# Patient Record
Sex: MALE | Race: ASIAN | NOT HISPANIC OR LATINO | Employment: UNEMPLOYED | URBAN - METROPOLITAN AREA
[De-identification: names, ages, dates, MRNs, and addresses within clinical notes are randomized per-mention and may not be internally consistent; named-entity substitution may affect disease eponyms.]

---

## 2023-10-29 ENCOUNTER — APPOINTMENT (OUTPATIENT)
Dept: CT IMAGING | Facility: HOSPITAL | Age: 33
End: 2023-10-29
Attending: EMERGENCY MEDICINE
Payer: COMMERCIAL

## 2023-10-29 ENCOUNTER — HOSPITAL ENCOUNTER (INPATIENT)
Facility: HOSPITAL | Age: 33
LOS: 2 days | Discharge: HOME | End: 2023-11-01
Attending: EMERGENCY MEDICINE | Admitting: FAMILY MEDICINE
Payer: COMMERCIAL

## 2023-10-29 DIAGNOSIS — B96.81 DUODENAL ULCER DUE TO HELICOBACTER PYLORI: Primary | ICD-10-CM

## 2023-10-29 DIAGNOSIS — K26.9 DUODENAL ULCER DUE TO HELICOBACTER PYLORI: Primary | ICD-10-CM

## 2023-10-29 DIAGNOSIS — K92.1 GASTROINTESTINAL HEMORRHAGE WITH MELENA: ICD-10-CM

## 2023-10-29 LAB
ABO/RH(D): NORMAL
ALBUMIN SERPL BCG-MCNC: 3.9 G/DL (ref 3.5–5.2)
ALP SERPL-CCNC: 53 U/L (ref 40–129)
ALT SERPL W P-5'-P-CCNC: 25 U/L (ref 0–70)
ANION GAP SERPL CALCULATED.3IONS-SCNC: 13 MMOL/L (ref 7–15)
ANTIBODY SCREEN: NEGATIVE
AST SERPL W P-5'-P-CCNC: 16 U/L (ref 0–45)
BASOPHILS # BLD AUTO: 0 10E3/UL (ref 0–0.2)
BASOPHILS NFR BLD AUTO: 0 %
BILIRUB DIRECT SERPL-MCNC: <0.2 MG/DL (ref 0–0.3)
BILIRUB SERPL-MCNC: 0.3 MG/DL
BUN SERPL-MCNC: 37.3 MG/DL (ref 6–20)
CALCIUM SERPL-MCNC: 8.7 MG/DL (ref 8.6–10)
CHLORIDE SERPL-SCNC: 105 MMOL/L (ref 98–107)
CREAT SERPL-MCNC: 0.97 MG/DL (ref 0.67–1.17)
DEPRECATED HCO3 PLAS-SCNC: 21 MMOL/L (ref 22–29)
EGFRCR SERPLBLD CKD-EPI 2021: >90 ML/MIN/1.73M2
EOSINOPHIL # BLD AUTO: 0 10E3/UL (ref 0–0.7)
EOSINOPHIL NFR BLD AUTO: 0 %
ERYTHROCYTE [DISTWIDTH] IN BLOOD BY AUTOMATED COUNT: 13.1 % (ref 10–15)
GLUCOSE SERPL-MCNC: 128 MG/DL (ref 70–99)
HCT VFR BLD AUTO: 33.1 % (ref 40–53)
HGB BLD-MCNC: 10.8 G/DL (ref 13.3–17.7)
HGB BLD-MCNC: 9.9 G/DL (ref 13.3–17.7)
IMM GRANULOCYTES # BLD: 0.1 10E3/UL
IMM GRANULOCYTES NFR BLD: 0 %
LYMPHOCYTES # BLD AUTO: 3 10E3/UL (ref 0.8–5.3)
LYMPHOCYTES NFR BLD AUTO: 26 %
MCH RBC QN AUTO: 28.7 PG (ref 26.5–33)
MCHC RBC AUTO-ENTMCNC: 32.6 G/DL (ref 31.5–36.5)
MCV RBC AUTO: 88 FL (ref 78–100)
MONOCYTES # BLD AUTO: 0.7 10E3/UL (ref 0–1.3)
MONOCYTES NFR BLD AUTO: 6 %
NEUTROPHILS # BLD AUTO: 7.9 10E3/UL (ref 1.6–8.3)
NEUTROPHILS NFR BLD AUTO: 68 %
NRBC # BLD AUTO: 0 10E3/UL
NRBC BLD AUTO-RTO: 0 /100
PLATELET # BLD AUTO: 223 10E3/UL (ref 150–450)
POTASSIUM SERPL-SCNC: 4.1 MMOL/L (ref 3.4–5.3)
PROT SERPL-MCNC: 6 G/DL (ref 6.4–8.3)
RBC # BLD AUTO: 3.76 10E6/UL (ref 4.4–5.9)
SODIUM SERPL-SCNC: 139 MMOL/L (ref 135–145)
SPECIMEN EXPIRATION DATE: NORMAL
WBC # BLD AUTO: 11.6 10E3/UL (ref 4–11)

## 2023-10-29 PROCEDURE — C9113 INJ PANTOPRAZOLE SODIUM, VIA: HCPCS | Mod: JZ | Performed by: EMERGENCY MEDICINE

## 2023-10-29 PROCEDURE — 96361 HYDRATE IV INFUSION ADD-ON: CPT

## 2023-10-29 PROCEDURE — 96365 THER/PROPH/DIAG IV INF INIT: CPT | Mod: 59

## 2023-10-29 PROCEDURE — 74174 CTA ABD&PLVS W/CONTRAST: CPT

## 2023-10-29 PROCEDURE — 250N000011 HC RX IP 250 OP 636: Mod: JZ | Performed by: EMERGENCY MEDICINE

## 2023-10-29 PROCEDURE — 86901 BLOOD TYPING SEROLOGIC RH(D): CPT | Performed by: EMERGENCY MEDICINE

## 2023-10-29 PROCEDURE — 36415 COLL VENOUS BLD VENIPUNCTURE: CPT | Performed by: EMERGENCY MEDICINE

## 2023-10-29 PROCEDURE — 85025 COMPLETE CBC W/AUTO DIFF WBC: CPT | Performed by: EMERGENCY MEDICINE

## 2023-10-29 PROCEDURE — 93005 ELECTROCARDIOGRAM TRACING: CPT | Performed by: EMERGENCY MEDICINE

## 2023-10-29 PROCEDURE — 85018 HEMOGLOBIN: CPT | Performed by: EMERGENCY MEDICINE

## 2023-10-29 PROCEDURE — 99222 1ST HOSP IP/OBS MODERATE 55: CPT | Mod: GC

## 2023-10-29 PROCEDURE — 86850 RBC ANTIBODY SCREEN: CPT | Performed by: EMERGENCY MEDICINE

## 2023-10-29 PROCEDURE — 86923 COMPATIBILITY TEST ELECTRIC: CPT

## 2023-10-29 PROCEDURE — 82248 BILIRUBIN DIRECT: CPT | Performed by: EMERGENCY MEDICINE

## 2023-10-29 PROCEDURE — 99285 EMERGENCY DEPT VISIT HI MDM: CPT | Mod: 25

## 2023-10-29 PROCEDURE — 258N000003 HC RX IP 258 OP 636: Performed by: EMERGENCY MEDICINE

## 2023-10-29 PROCEDURE — 80053 COMPREHEN METABOLIC PANEL: CPT | Performed by: EMERGENCY MEDICINE

## 2023-10-29 PROCEDURE — 83036 HEMOGLOBIN GLYCOSYLATED A1C: CPT

## 2023-10-29 PROCEDURE — 96376 TX/PRO/DX INJ SAME DRUG ADON: CPT

## 2023-10-29 RX ORDER — SODIUM CHLORIDE, SODIUM LACTATE, POTASSIUM CHLORIDE, CALCIUM CHLORIDE 600; 310; 30; 20 MG/100ML; MG/100ML; MG/100ML; MG/100ML
INJECTION, SOLUTION INTRAVENOUS CONTINUOUS
Status: DISCONTINUED | OUTPATIENT
Start: 2023-10-29 | End: 2023-10-30

## 2023-10-29 RX ORDER — IBUPROFEN 200 MG
400 TABLET ORAL EVERY 4 HOURS PRN
Status: ON HOLD | COMMUNITY
End: 2023-11-01

## 2023-10-29 RX ORDER — IOPAMIDOL 755 MG/ML
100 INJECTION, SOLUTION INTRAVASCULAR ONCE
Status: COMPLETED | OUTPATIENT
Start: 2023-10-29 | End: 2023-10-29

## 2023-10-29 RX ADMIN — IOPAMIDOL 90 ML: 755 INJECTION, SOLUTION INTRAVENOUS at 21:34

## 2023-10-29 RX ADMIN — SODIUM CHLORIDE 8 MG/HR: 9 INJECTION, SOLUTION INTRAVENOUS at 23:29

## 2023-10-29 RX ADMIN — SODIUM CHLORIDE, POTASSIUM CHLORIDE, SODIUM LACTATE AND CALCIUM CHLORIDE: 600; 310; 30; 20 INJECTION, SOLUTION INTRAVENOUS at 23:31

## 2023-10-29 RX ADMIN — SODIUM CHLORIDE 1000 ML: 9 INJECTION, SOLUTION INTRAVENOUS at 20:53

## 2023-10-29 RX ADMIN — PANTOPRAZOLE SODIUM 40 MG: 40 INJECTION, POWDER, FOR SOLUTION INTRAVENOUS at 22:01

## 2023-10-29 ASSESSMENT — ACTIVITIES OF DAILY LIVING (ADL)
ADLS_ACUITY_SCORE: 35
ADLS_ACUITY_SCORE: 35

## 2023-10-29 NOTE — LETTER
[address] [address]  Phone: [phone]  Fax: [phone]    November 1, 2023        Gretel Gross  [address] [place] [address]          To whom it may concern:    RE: Gretel Gross was admitted at [facility] from 10/29/23-11/1/23. He should be medically cleared to resume work on 11/10/23.     Please contact me for questions or concerns.      Sincerely,        Alexandre Meek MD  [facility] Residency, PGY-1

## 2023-10-30 PROBLEM — K92.1 GASTROINTESTINAL HEMORRHAGE WITH MELENA: Status: ACTIVE | Noted: 2023-10-30

## 2023-10-30 LAB
ANION GAP SERPL CALCULATED.3IONS-SCNC: 11 MMOL/L (ref 7–15)
BUN SERPL-MCNC: 23.6 MG/DL (ref 6–20)
CALCIUM SERPL-MCNC: 7.8 MG/DL (ref 8.6–10)
CHLORIDE SERPL-SCNC: 106 MMOL/L (ref 98–107)
CREAT SERPL-MCNC: 1.09 MG/DL (ref 0.67–1.17)
DEPRECATED HCO3 PLAS-SCNC: 22 MMOL/L (ref 22–29)
EGFRCR SERPLBLD CKD-EPI 2021: >90 ML/MIN/1.73M2
ERYTHROCYTE [DISTWIDTH] IN BLOOD BY AUTOMATED COUNT: 13.2 % (ref 10–15)
ERYTHROCYTE [DISTWIDTH] IN BLOOD BY AUTOMATED COUNT: 13.2 % (ref 10–15)
GLUCOSE SERPL-MCNC: 112 MG/DL (ref 70–99)
HBA1C MFR BLD: 5.2 %
HCT VFR BLD AUTO: 25.5 % (ref 40–53)
HCT VFR BLD AUTO: 27 % (ref 40–53)
HGB BLD-MCNC: 7.9 G/DL (ref 13.3–17.7)
HGB BLD-MCNC: 8.5 G/DL (ref 13.3–17.7)
HGB BLD-MCNC: 9 G/DL (ref 13.3–17.7)
HOLD SPECIMEN: NORMAL
MCH RBC QN AUTO: 29.1 PG (ref 26.5–33)
MCH RBC QN AUTO: 29.3 PG (ref 26.5–33)
MCHC RBC AUTO-ENTMCNC: 33.3 G/DL (ref 31.5–36.5)
MCHC RBC AUTO-ENTMCNC: 33.3 G/DL (ref 31.5–36.5)
MCV RBC AUTO: 87 FL (ref 78–100)
MCV RBC AUTO: 88 FL (ref 78–100)
PLATELET # BLD AUTO: 166 10E3/UL (ref 150–450)
PLATELET # BLD AUTO: 177 10E3/UL (ref 150–450)
POTASSIUM SERPL-SCNC: 3.8 MMOL/L (ref 3.4–5.3)
RBC # BLD AUTO: 2.9 10E6/UL (ref 4.4–5.9)
RBC # BLD AUTO: 3.09 10E6/UL (ref 4.4–5.9)
SODIUM SERPL-SCNC: 139 MMOL/L (ref 135–145)
UPPER GI ENDOSCOPY: NORMAL
WBC # BLD AUTO: 7.9 10E3/UL (ref 4–11)
WBC # BLD AUTO: 9.5 10E3/UL (ref 4–11)

## 2023-10-30 PROCEDURE — 43235 EGD DIAGNOSTIC BRUSH WASH: CPT | Performed by: INTERNAL MEDICINE

## 2023-10-30 PROCEDURE — 36415 COLL VENOUS BLD VENIPUNCTURE: CPT | Performed by: FAMILY MEDICINE

## 2023-10-30 PROCEDURE — 80048 BASIC METABOLIC PNL TOTAL CA: CPT

## 2023-10-30 PROCEDURE — 250N000011 HC RX IP 250 OP 636: Mod: JZ | Performed by: INTERNAL MEDICINE

## 2023-10-30 PROCEDURE — 88305 TISSUE EXAM BY PATHOLOGIST: CPT | Mod: 26 | Performed by: PATHOLOGY

## 2023-10-30 PROCEDURE — 120N000001 HC R&B MED SURG/OB

## 2023-10-30 PROCEDURE — 85027 COMPLETE CBC AUTOMATED: CPT

## 2023-10-30 PROCEDURE — 88342 IMHCHEM/IMCYTCHM 1ST ANTB: CPT | Mod: TC | Performed by: INTERNAL MEDICINE

## 2023-10-30 PROCEDURE — 36415 COLL VENOUS BLD VENIPUNCTURE: CPT

## 2023-10-30 PROCEDURE — 0DB68ZX EXCISION OF STOMACH, VIA NATURAL OR ARTIFICIAL OPENING ENDOSCOPIC, DIAGNOSTIC: ICD-10-PCS | Performed by: INTERNAL MEDICINE

## 2023-10-30 PROCEDURE — 88342 IMHCHEM/IMCYTCHM 1ST ANTB: CPT | Mod: 26 | Performed by: PATHOLOGY

## 2023-10-30 PROCEDURE — 250N000009 HC RX 250: Performed by: INTERNAL MEDICINE

## 2023-10-30 PROCEDURE — 85018 HEMOGLOBIN: CPT | Performed by: FAMILY MEDICINE

## 2023-10-30 PROCEDURE — C9113 INJ PANTOPRAZOLE SODIUM, VIA: HCPCS | Mod: JZ | Performed by: INTERNAL MEDICINE

## 2023-10-30 PROCEDURE — 258N000003 HC RX IP 258 OP 636

## 2023-10-30 PROCEDURE — 99232 SBSQ HOSP IP/OBS MODERATE 35: CPT | Mod: GC

## 2023-10-30 PROCEDURE — 258N000003 HC RX IP 258 OP 636: Performed by: EMERGENCY MEDICINE

## 2023-10-30 PROCEDURE — 999N000099 HC STATISTIC MODERATE SEDATION < 10 MIN: Performed by: INTERNAL MEDICINE

## 2023-10-30 RX ORDER — AMOXICILLIN 250 MG
2 CAPSULE ORAL 2 TIMES DAILY PRN
Status: DISCONTINUED | OUTPATIENT
Start: 2023-10-30 | End: 2023-11-01 | Stop reason: HOSPADM

## 2023-10-30 RX ORDER — FENTANYL CITRATE 50 UG/ML
INJECTION, SOLUTION INTRAMUSCULAR; INTRAVENOUS PRN
Status: DISCONTINUED | OUTPATIENT
Start: 2023-10-30 | End: 2023-10-30 | Stop reason: HOSPADM

## 2023-10-30 RX ORDER — ONDANSETRON 2 MG/ML
4 INJECTION INTRAMUSCULAR; INTRAVENOUS EVERY 6 HOURS PRN
Status: DISCONTINUED | OUTPATIENT
Start: 2023-10-30 | End: 2023-11-01 | Stop reason: HOSPADM

## 2023-10-30 RX ORDER — FLUMAZENIL 0.1 MG/ML
0.2 INJECTION, SOLUTION INTRAVENOUS
Status: DISCONTINUED | OUTPATIENT
Start: 2023-10-30 | End: 2023-10-30 | Stop reason: HOSPADM

## 2023-10-30 RX ORDER — SIMETHICONE 40MG/0.6ML
133 SUSPENSION, DROPS(FINAL DOSAGE FORM)(ML) ORAL
Status: DISCONTINUED | OUTPATIENT
Start: 2023-10-30 | End: 2023-10-30 | Stop reason: HOSPADM

## 2023-10-30 RX ORDER — EPINEPHRINE 1 MG/ML
0.1 INJECTION, SOLUTION INTRAMUSCULAR; SUBCUTANEOUS
Status: DISCONTINUED | OUTPATIENT
Start: 2023-10-30 | End: 2023-10-30 | Stop reason: HOSPADM

## 2023-10-30 RX ORDER — POLYETHYLENE GLYCOL 3350 17 G/17G
17 POWDER, FOR SOLUTION ORAL DAILY PRN
Status: DISCONTINUED | OUTPATIENT
Start: 2023-10-30 | End: 2023-11-01 | Stop reason: HOSPADM

## 2023-10-30 RX ORDER — NALOXONE HYDROCHLORIDE 0.4 MG/ML
0.4 INJECTION, SOLUTION INTRAMUSCULAR; INTRAVENOUS; SUBCUTANEOUS
Status: DISCONTINUED | OUTPATIENT
Start: 2023-10-30 | End: 2023-10-30 | Stop reason: HOSPADM

## 2023-10-30 RX ORDER — DIPHENHYDRAMINE HYDROCHLORIDE 50 MG/ML
25-50 INJECTION INTRAMUSCULAR; INTRAVENOUS
Status: DISCONTINUED | OUTPATIENT
Start: 2023-10-30 | End: 2023-10-30 | Stop reason: HOSPADM

## 2023-10-30 RX ORDER — PROCHLORPERAZINE 25 MG
25 SUPPOSITORY, RECTAL RECTAL EVERY 12 HOURS PRN
Status: DISCONTINUED | OUTPATIENT
Start: 2023-10-30 | End: 2023-11-01 | Stop reason: HOSPADM

## 2023-10-30 RX ORDER — PROCHLORPERAZINE MALEATE 10 MG
10 TABLET ORAL EVERY 6 HOURS PRN
Status: DISCONTINUED | OUTPATIENT
Start: 2023-10-30 | End: 2023-11-01 | Stop reason: HOSPADM

## 2023-10-30 RX ORDER — ATROPINE SULFATE 0.1 MG/ML
1 INJECTION INTRAVENOUS
Status: DISCONTINUED | OUTPATIENT
Start: 2023-10-30 | End: 2023-10-30 | Stop reason: HOSPADM

## 2023-10-30 RX ORDER — ONDANSETRON 4 MG/1
4 TABLET, ORALLY DISINTEGRATING ORAL EVERY 6 HOURS PRN
Status: DISCONTINUED | OUTPATIENT
Start: 2023-10-30 | End: 2023-11-01 | Stop reason: HOSPADM

## 2023-10-30 RX ORDER — AMOXICILLIN 250 MG
1 CAPSULE ORAL 2 TIMES DAILY PRN
Status: DISCONTINUED | OUTPATIENT
Start: 2023-10-30 | End: 2023-11-01 | Stop reason: HOSPADM

## 2023-10-30 RX ORDER — ACETAMINOPHEN 650 MG/1
650 SUPPOSITORY RECTAL EVERY 6 HOURS PRN
Status: DISCONTINUED | OUTPATIENT
Start: 2023-10-30 | End: 2023-11-01 | Stop reason: HOSPADM

## 2023-10-30 RX ORDER — ACETAMINOPHEN 325 MG/1
650 TABLET ORAL EVERY 6 HOURS PRN
Status: DISCONTINUED | OUTPATIENT
Start: 2023-10-30 | End: 2023-11-01 | Stop reason: HOSPADM

## 2023-10-30 RX ORDER — FENTANYL CITRATE 50 UG/ML
50-100 INJECTION, SOLUTION INTRAMUSCULAR; INTRAVENOUS EVERY 5 MIN PRN
Status: DISCONTINUED | OUTPATIENT
Start: 2023-10-30 | End: 2023-10-30 | Stop reason: HOSPADM

## 2023-10-30 RX ORDER — NALOXONE HYDROCHLORIDE 0.4 MG/ML
0.2 INJECTION, SOLUTION INTRAMUSCULAR; INTRAVENOUS; SUBCUTANEOUS
Status: DISCONTINUED | OUTPATIENT
Start: 2023-10-30 | End: 2023-10-30 | Stop reason: HOSPADM

## 2023-10-30 RX ADMIN — PANTOPRAZOLE SODIUM 40 MG: 40 INJECTION, POWDER, FOR SOLUTION INTRAVENOUS at 11:05

## 2023-10-30 RX ADMIN — SODIUM CHLORIDE, POTASSIUM CHLORIDE, SODIUM LACTATE AND CALCIUM CHLORIDE: 600; 310; 30; 20 INJECTION, SOLUTION INTRAVENOUS at 08:21

## 2023-10-30 RX ADMIN — SODIUM CHLORIDE, POTASSIUM CHLORIDE, SODIUM LACTATE AND CALCIUM CHLORIDE 1000 ML: 600; 310; 30; 20 INJECTION, SOLUTION INTRAVENOUS at 01:43

## 2023-10-30 ASSESSMENT — ACTIVITIES OF DAILY LIVING (ADL)
ADLS_ACUITY_SCORE: 37
ADLS_ACUITY_SCORE: 35
ADLS_ACUITY_SCORE: 37

## 2023-10-30 NOTE — PLAN OF CARE
"  Problem: Adult Inpatient Plan of Care  Goal: Plan of Care Review  Description: The Plan of Care Review/Shift note should be completed every shift.  The Outcome Evaluation is a brief statement about your assessment that the patient is improving, declining, or no change.  This information will be displayed automatically on your shift  note.  Outcome: Progressing     Problem: Adult Inpatient Plan of Care  Goal: Optimal Comfort and Wellbeing  Outcome: Progressing     Problem: Adult Inpatient Plan of Care  Goal: Patient-Specific Goal (Individualized)  Description: You can add care plan individualizations to a care plan. Examples of Individualization might be:  \"Parent requests to be called daily at 9am for status\", \"I have a hard time hearing out of my right ear\", or \"Do not touch me to wake me up as it startles  me\".  Outcome: Progressing   Goal Outcome Evaluation:       10/30 0400 Resident paged and notified about patient drop in hgb to a 9 and patient being tachycardic at 105, MD stated \"thank you for the update and continue to monitor\".       Patient stable, vitals stable through the night, no complaints of pain, Hgb down from a 9.9 to a 9.0,  no active signs of bleeding, pending 6am labs to be drawn, report given to Veronique GARCÍA all questions answered.           "

## 2023-10-30 NOTE — PRE-PROCEDURE
GENERAL PRE-PROCEDURE:   Procedure:  Esophagogastroduodenoscopy  Date/Time:  10/30/2023 9:23 AM    Verbal consent obtained?: Yes    Written consent obtained?: Yes    Risks and benefits: Risks, benefits and alternatives were discussed    Consent given by:  Patient  Patient states understanding of procedure being performed: Yes    Patient's understanding of procedure matches consent: Yes    Procedure consent matches procedure scheduled: Yes    Expected level of sedation:  Moderate  Appropriately NPO:  Yes  ASA Class:  2  Mallampati  :  Grade 2- soft palate, base of uvula, tonsillar pillars, and portion of posterior pharyngeal wall visible  Lungs:  Lungs clear with good breath sounds bilaterally  Heart:  Normal heart sounds and rate  History & Physical reviewed:  History and physical reviewed and no updates needed  Statement of review:  I have reviewed the lab findings, diagnostic data, medications, and the plan for sedation

## 2023-10-30 NOTE — PROGRESS NOTES
Resident/Fellow Attestation   I, DERRICK ARIAS MD, was present with the medical student who participated in the service and in the documentation of the note.  I have verified the history and personally performed the physical exam and medical decision making.  I agree with the assessment and plan of care as documented in the note.      Repeat hemoglobin of 7.9; still feeling tired. Will re-check hemoglobin in the AM.       DERRICK ARIAS MD  PGY1  Date of Service (when I saw the patient): 10/30/23    [facility]    Progress Note - Hospitalist Service       Date of Admission:  10/29/2023    Assessment & Plan   Gretel Gross is a 32 year old male admitted on 10/29/2023. He has no significant past medical history and is admitted for melena with suspected GI bleed.     Melena  Concern for upper GI bleed  Anemia: 10.8 -> 8.5   Sinus Tachycardia  Patient with 1 day history of melena, shortness of breath, fatigue, palpitations. Found to be tachycardic to 115 and anemic with a Hgb of 10.8 and elevated BUN on arrival. Tachycardia improved slightly overnight after fluid boluses but remained tachycardic at 119 in AM. Has been otherwise stable. Unsure of etiology of upper GI bleed; patient notes no history of bleeding, no family history of GI bleeds or malignancies. Did note increased alcohol use in the past 1-2 months due to life stressors and family deaths; reports 3-4 drinks daily. No caffeine or other substances. EGD biopsy pending.   - Protonix 8mg/hr infusion  - GI consulted; EGD 10/30 AM   - Normal esophagus, gastritis with biopsy, non-bleeding duodenal ulcer with no stigmata of bleeding.    - Recommendations:     - Protonix 40mg IV daily until discharge, omeprazole 20mg daily for 60 days. Okay to discharge once Hgb stable.   -Recheck hemoglobin in AM; if stable, can likely discharge  -Advance diet as tolerated  - Follow up alcohol use; patient appears motivated to quit drinking    Leukocytosis,  improved  WBC elevated to 11.6 on arrival, improved to 7.9.     Obesity  Hyperglycemia  Glucose of 128 on admission but with A1C of 5.2.     Healthcare Maintenance  No PCP, will discuss follow up closer to discharge.         Diet: Regular Diet Adult    DVT Prophylaxis: Pneumatic Compression Devices  South Catheter: Not present  Fluids: LR @ 150 ml/hr.   Lines: None     Cardiac Monitoring: ACTIVE order. Indication: Acute GIB - anemia - sinus tachycardia  Code Status: Full Code        Disposition Plan      Expected Discharge Date: 10/31/23               The patient's care was discussed with MD OPAL Abreu, MS4  Medical Student  Hospitalist Service  [facility]  Securely message with [employer] (more info)  Text page via [facility] Paging/Directory   ______________________________________________________________________    Interval History   Patient stable overnight, remained tachycardic and Hgb continued to fall after fluid boluses, but otherwise stable. Stable after EGD, feeling well overall. Noted increased life stress at work and with several family deaths. Has had increased alcohol use with 3-4 drinks daily but states he is not going to continue this on discharge and is aware that this may have contributed to his GI bleed.     Physical Exam   Vital Signs: Temp: 98.8  F (37.1  C) Temp src: Temporal BP: 110/57 Pulse: 109   Resp: 16 SpO2: 100 % O2 Device: None (Room air) Oxygen Delivery: 2 LPM  Weight: 220 lbs 0 oz    Constitutional: awake, alert, cooperative, no apparent distress, and appears stated age  Respiratory: No increased work of breathing, good air exchange, clear to auscultation bilaterally, no crackles or wheezing  Cardiovascular: Normal apical impulse, regular rate and rhythm, normal S1 and S2, no S3 or S4, and no murmur noted  GI: No scars, normal bowel sounds, soft, non-distended, non-tender, no masses palpated, no hepatosplenomegally    Data     I have personally  reviewed the following data over the past 24 hrs:    7.9  \   8.5 (L)   / 166     139 106 23.6 (H) /  112 (H)   3.8 22 1.09 \     ALT: 25 AST: 16 AP: 53 TBILI: 0.3   ALB: 3.9 TOT PROTEIN: 6.0 (L) LIPASE: N/A     TSH: N/A T4: N/A A1C: 5.2       Imaging results reviewed over the past 24 hrs:   Recent Results (from the past 24 hour(s))   CTA Abdomen Pelvis with Contrast    Narrative    EXAM: CTA ABDOMEN PELVIS WITH CONTRAST  LOCATION: [facility]  DATE: 10/29/2023    INDICATION: gi bleeding  COMPARISON: None.  TECHNIQUE: CT angiogram abdomen pelvis during arterial phase of injection of IV contrast. 2D and 3D MIP reconstructions were performed by the CT technologist. Dose reduction techniques were used.  CONTRAST: 90 ml iso 370    FINDINGS:  ANGIOGRAM ABDOMEN/PELVIS: No evidence for abdominal aortic aneurysm. No significant atherosclerotic disease involving the abdominal aorta or the branch vessels. No hemodynamically significant stenosis involving the branch arteries of the abdominal aorta   and the iliac arteries in the pelvis. No CTA evidence for active GI extravasation.    LOWER CHEST: Dependent atelectasis in the posterior lung bases.    HEPATOBILIARY: No significant mass or bile duct dilatation. No calcified gallstones.     PANCREAS: No significant mass, duct dilatation, or inflammatory change.    SPLEEN: Normal.    ADRENAL GLANDS: Normal.    KIDNEYS/BLADDER: No significant mass, stone, or hydronephrosis.    BOWEL: Nonspecific nonobstructive bowel gas pattern. Appendix visualized and appears within normal limits. No evidence for colitis or diverticulitis.    LYMPH NODES: No lymphadenopathy.    PELVIC ORGANS: No pelvic masses.    MUSCULOSKELETAL: No concerning osseous abnormalities.      Impression    IMPRESSION:  1.  No evidence for abdominal aortic aneurysm. No significant atherosclerotic disease involving the abdominal aorta or the branch vessels. No hemodynamically significant stenosis  involving the branch arteries of the abdominal aorta and the iliac arteries   in the pelvis. No CTA evidence for active GI extravasation.    2.  Nonspecific nonobstructive bowel gas pattern. Appendix visualized and appears within normal limits. No evidence for colitis or diverticulitis.      186.9

## 2023-10-30 NOTE — CONSULTS
"                                     CONSULTING PHYSICIAN   Rangel Garcia MD     REASON FOR CONSULTATION   Melena     CHIEF COMPLAINT   Melena     HISTORY OF PRESENT ILLNESS   Gretel Gross is a 32 year old came to the hospital for evaluation of melena.    Patient notes 6 episodes of black stool yesterday. No previous hx of GI bleeding. No abd pain.   Not on NSAIDs, no hx of H Pylori  [place] No international travel. Parents live him and were born internationally.        PAST HISTORY   History reviewed. No pertinent past medical history.   History reviewed. No pertinent surgical history.     Family History Social History   History reviewed. No pertinent family history. Social History     Tobacco Use    Smoking status: Every Day          MEDICATIONS & ALLERGIES   Medications Prior to Admission   Medication Sig Dispense Refill Last Dose    ibuprofen (ADVIL/MOTRIN) 200 MG tablet Take 400 mg by mouth every 4 hours as needed for pain   Unknown at prn        ALLERGIES   No Known Allergies      REVIEW OF SYSTEMS   A comprehensive review of systems was performed and was otherwise noncontributory.     OBJECTIVE   Vitals Blood pressure 105/69, pulse 100, temperature 98.8  F (37.1  C), temperature source Temporal, resp. rate 16, height 1.702 m (5' 7\"), weight 99.8 kg (220 lb), SpO2 98%.           Physical  Exam  GENERAL: alert and oriented, well nourished in no apparent distress   SKIN: warm and dry, no rashes   HEENT: atraumatic, anicteric, moist mucous membranes, neck soft/supple    PULMONARY: normal resp effort, breath sounds clear to auscultation bilateral   CARDIOVASCULAR: normal rate and rhythm, no murmurs, no edema   ABDOMEN: no tenderness, no distention, bowel sounds normal   MUSCULOSKELETAL: joints and gait normal   NEUROLOGICAL: appropriate mental status, grossly intact  DERM: no rash, no jaundice   PSYCHIATRIC: normal mood, affect and insight        LABORATORY    ELECTROLYTE PANEL   Recent Labs   Lab " 10/30/23  0703 10/29/23  2053    139   POTASSIUM 3.8 4.1   CHLORIDE 106 105   CO2 22 21*   * 128*   CR 1.09 0.97   BUN 23.6* 37.3*      HEMATOLOGY PANEL   Recent Labs   Lab 10/30/23  0703 10/30/23  0331 10/29/23  2250 10/29/23  2053   HGB 8.5* 9.0* 9.9* 10.8*   MCV 88 87  --  88   WBC 7.9 9.5  --  11.6*    177  --  223      LIVER AND PANCREAS PANEL   Recent Labs   Lab 10/29/23  2053   AST 16   ALT 25   ALKPHOS 53   BILITOTAL 0.3     IMAGING STUDIES        I have reviewed the current diagnostic and laboratory tests.           IMPRESSION   Gretel Gross is a 32 year old male with melena    Melena- hgb 8.5. Suspect upper GI bleed, likely PUD.   Started PPI   Plan EGD  Keep npo               Arnie Ha MD  Thank you for the opportunity to participate in the care of this patient.   Please feel free to call me with any questions or concerns.  Phone number [phone].

## 2023-10-30 NOTE — H&P
[facility]    History and Physical - Hospitalist Service       Date of Admission:  10/29/2023    Assessment & Plan      Gretel Gross is a 32 year old male admitted on 10/29/2023. He has an uncomplicated past medical history and is admitted for melana and likely upper GIB.     Melana  C/f upper GIB  Anemia: 10.8 -> 9.9 (after 1 L NS bolus)  Sinus Tachycardia   Patient is hemodynamically stable - tachycardic to 115 but normotensive. 1 day of multiple melanotic stools associated with worsening symptoms of anemia (SOB, dizziness, profound fatigue, heart palpitations). Feeling well in weeks prior other than increased stress (work and family). Hgb 10.8 on admission and 9.9 on recheck after 1 L bolus - unknown baseline. CTA Abd/pelv without signs of active GI extravasation. Suspect patient is experiencing and upper GIB based on melanotic stooling, anemia, and elevated BUN. The etiology of his upper GIB could be many things including PUD (H pylori, stress induced or idiopathic), angiodysplasia or other vascular lesion, upper GI tumor. Most likely to represent PUD from H pylori. There may be a component of stress/smoking contributing as well. Less likely to be secondary to portal hypertension with no significant ascites, history of significant alcohol abuse and normal LFTs. No reason to suspect a traumatic bleed from vomiting as patient has not had emesis.   - Protonix 8 mg/hr infusion continuously  - S/p 1 L NS bolus and will give another 1 L LR bolus - will continue with  ml/hr infusion after bolus  - NPO in prep for likely upper endoscopy tomorrow  - GI consult: Appreciate their assistance   - Likely to due upper endoscopy tomorrow   - Will call GI earlier if patient's tachycardia does not improve with second bolus and/or tachycardia worsens   - Cardiac Telemetry overnight  - Recheck Hgb at 0300  - AM labs: BMP and CBC     Leukocytosis  Very mild elevation to 11.6. Suspect that this is due  "to his likely upper GIB. There could be an element of stress reaction and/or hyperproliferation 2/2 increased bone marrow stimulation in the setting of anemia.  - Repeat CBC in AM    Obesity  Elevated blood sugar   Glucose of 128 on admission. Could be a stress reaction. Does not follow with regular healthcare provider.  - A1c of 5.2 on admission    Tobacco abuse  1/2 PPD smoker    Health maintenance  Does not follow with a regular healthcare provider. Appears to be experiencing significant stressors in the recent weeks.  - Establish care with PCP         Diet: NPO per Anesthesia Guidelines for Procedure/Surgery Except for: Meds  DVT Prophylaxis: Pneumatic Compression Devices  South Catheter: Not present  Fluids: LR @ 150 ml/hr.  Lines: None     Cardiac Monitoring: ACTIVE order. Indication: Acute GIB - anemia - sinus tachycardia  Code Status: Full Code    Clinically Significant Risk Factors Present on Admission                       # Obesity: Estimated body mass index is 34.46 kg/m  as calculated from the following:    Height as of this encounter: 1.702 m (5' 7\").    Weight as of this encounter: 99.8 kg (220 lb).              Disposition Plan      Expected Discharge Date: 11/01/2023                The patient's care was discussed with the Attending Physician, Dr. Garcia and morning report .      Sheldon Mauro MD  Hospitalist Service  [facility]  Securely message with "[employer] (more info)  Text page via [employer] Paging/Directory   ______________________________________________________________________    Chief Complaint   Dark stool and fatigue    History is obtained from the patient    History of Present Illness   Gretel Gross is a 32 year old male with an uncomplicated past medical history and is admitted for melana and possible GIB.     Patient has had 5-6 dark/black loose stools today. Initially patient wondered if it was related to his change in diet, however, throughout the day he began to " develop fatigue, shortness of breath, light headedness with standing and in general, and heart palpitations as well. This prompted him to come in for evaluation. He has never had black stools like this previously but has had rare, intermittent bright red blood during bowel movements and with wiping. Patient has been more stressed with work and family life in the past few weeks but has otherwise felt well.     No recent illness, cough, runny nose, abdominal pain, change in urination or change in bowel movements (before today), headache, chest pain.    No family history of GIB, colon/stomach/GI cancers.     Lives in a house in [place] with parents, wife, 15 month daughter. Works at the [employer]. Very stressed recently (stressful work season and deaths in the family). 1/2 PPD. Alcohol 2-3 times weekly with about 4 beers. No other recreational drug use.     Past Medical History    History reviewed. No pertinent past medical history.    Past Surgical History   History reviewed. No pertinent surgical history.    Prior to Admission Medications   Prior to Admission Medications   Prescriptions Last Dose Informant Patient Reported? Taking?   ibuprofen (ADVIL/MOTRIN) 200 MG tablet Unknown at prn Self Yes Yes   Sig: Take 400 mg by mouth every 4 hours as needed for pain      Facility-Administered Medications: None           Physical Exam   Vital Signs: Temp: 97.7  F (36.5  C) Temp src: Oral BP: 124/71 Pulse: 110   Resp: 21 SpO2: 100 % O2 Device: None (Room air)    Weight: 220 lbs 0 oz    Constitutional: awake, alert, cooperative, no apparent distress, and appears stated age  Eyes: Lids and lashes normal, pupils equal, round and reactive to light, extra ocular muscles intact, sclera clear, conjunctiva normal  ENT: Normocephalic, without obvious abnormality, atraumatic, external ears without lesions, oral pharynx with moist mucous membranes, tonsils without erythema or exudates, gums normal and good dentition.  Hematologic /  Lymphatic: no cervical lymphadenopathy and no supraclavicular lymphadenopathy  Respiratory: No increased work of breathing, good air exchange, clear to auscultation bilaterally, no crackles or wheezing  Cardiovascular: Tachycardic rate and normal sinus rhythm, normal S1 and S2, and no murmur noted  GI: No scars, hypoactive bowel sounds, soft, mildly distended, non-tender, no masses palpated, no hepatosplenomegaly  Skin: normal skin color, texture, turgor  Musculoskeletal: There is no redness, warmth, or swelling of the joints.  Full range of motion noted.  Motor strength is 5 out of 5 all extremities bilaterally.  Tone is normal.  Neurologic: Awake, alert, oriented to name, place and time.  Cranial nerves II-XII are grossly intact.  Motor is 5 out of 5 bilaterally.   Neuropsychiatric: General: normal, mildly anxious regarding procedure, and normal eye contact  Level of consciousness: alert / normal  Affect: normal  Orientation: oriented to self, place, time and situation    Medical Decision Making       Please see A&P for additional details of medical decision making.      Data     I have personally reviewed the following data over the past 24 hrs:    11.6 (H)  \   9.9 (L)   / 223     139 105 37.3 (H) /  128 (H)   4.1 21 (L) 0.97 \     ALT: 25 AST: 16 AP: 53 TBILI: 0.3   ALB: 3.9 TOT PROTEIN: 6.0 (L) LIPASE: N/A       Imaging results reviewed over the past 24 hrs:   Recent Results (from the past 24 hour(s))   CTA Abdomen Pelvis with Contrast    Narrative    EXAM: CTA ABDOMEN PELVIS WITH CONTRAST  LOCATION: [facility]  DATE: 10/29/2023    INDICATION: gi bleeding  COMPARISON: None.  TECHNIQUE: CT angiogram abdomen pelvis during arterial phase of injection of IV contrast. 2D and 3D MIP reconstructions were performed by the CT technologist. Dose reduction techniques were used.  CONTRAST: 90 ml iso 370    FINDINGS:  ANGIOGRAM ABDOMEN/PELVIS: No evidence for abdominal aortic aneurysm. No  significant atherosclerotic disease involving the abdominal aorta or the branch vessels. No hemodynamically significant stenosis involving the branch arteries of the abdominal aorta   and the iliac arteries in the pelvis. No CTA evidence for active GI extravasation.    LOWER CHEST: Dependent atelectasis in the posterior lung bases.    HEPATOBILIARY: No significant mass or bile duct dilatation. No calcified gallstones.     PANCREAS: No significant mass, duct dilatation, or inflammatory change.    SPLEEN: Normal.    ADRENAL GLANDS: Normal.    KIDNEYS/BLADDER: No significant mass, stone, or hydronephrosis.    BOWEL: Nonspecific nonobstructive bowel gas pattern. Appendix visualized and appears within normal limits. No evidence for colitis or diverticulitis.    LYMPH NODES: No lymphadenopathy.    PELVIC ORGANS: No pelvic masses.    MUSCULOSKELETAL: No concerning osseous abnormalities.      Impression    IMPRESSION:  1.  No evidence for abdominal aortic aneurysm. No significant atherosclerotic disease involving the abdominal aorta or the branch vessels. No hemodynamically significant stenosis involving the branch arteries of the abdominal aorta and the iliac arteries   in the pelvis. No CTA evidence for active GI extravasation.    2.  Nonspecific nonobstructive bowel gas pattern. Appendix visualized and appears within normal limits. No evidence for colitis or diverticulitis.

## 2023-10-30 NOTE — ED PROVIDER NOTES
"EMERGENCY DEPARTMENT NOTE     Name: Gretel Gross    Age/Sex: 32 year old male   MRN: [id number]   Evaluation Date & Time:  10/29/2023  8:07 PM    PCP:    No primary care provider on file.   ED Provider: Boris Lipscomb D.O.       CHIEF COMPLAINT    Rectal Bleeding       DIAGNOSIS & DISPOSITION/MEDICAL DECISION MAKING     1. Gastrointestinal hemorrhage with melena        Gretel Gross is a 32 year old male with no recorded relevant past history who presents to the emergency department for evaluation of five episodes of dark stool today with associated dizziness.    Differential  diagnosis considered included but not limited to Upper or lower GI bleeding    Medical Decision Making  Patient initially tachycardic.  Abdomen nontender.  Rectal exam showed maroon stool/blood.  CTA of the abdomen pelvis without active bleeding noted.  Initial hemoglobin 10.8.  Patient received IV fluids and was also started on Protonix and Protonix drip.  Patient had 1 melanotic stool approximately 100 cc while in the emergency department.  Serial abdominal exams remain nontender.  Heart rate improved, remained normotensive, serial hemoglobin 9.9.  Patient will be admitted, serial hemoglobins, monitor vital signs, GI consultation in the a.m.  Case was discussed with the [place] resident Dr. So and [facility] Dr. Wasserman  Current findings and plan for admission discussed with the patient and his wife and they are in agreement.  Interventions: IV fluids, Protonix  Discharge Vital Signs:/75   Pulse 103   Temp 97.7  F (36.5  C) (Oral)   Resp 16   Ht 1.702 m (5' 7\")   Wt 99.8 kg (220 lb)   SpO2 97%   BMI 34.46 kg/m       DISPOSITION: Admit To [facility] telemetry/PVS    Diagnostic studies:  Imaging:  CTA Abdomen Pelvis with Contrast   Final Result   IMPRESSION:   1.  No evidence for abdominal aortic aneurysm. No significant atherosclerotic disease involving the abdominal aorta or the branch vessels. No hemodynamically significant " stenosis involving the branch arteries of the abdominal aorta and the iliac arteries    in the pelvis. No CTA evidence for active GI extravasation.      2.  Nonspecific nonobstructive bowel gas pattern. Appendix visualized and appears within normal limits. No evidence for colitis or diverticulitis.         Lab:  Labs Ordered and Resulted from Time of ED Arrival to Time of ED Departure   BASIC METABOLIC PANEL - Abnormal       Result Value    Sodium 139      Potassium 4.1      Chloride 105      Carbon Dioxide (CO2) 21 (*)     Anion Gap 13      Urea Nitrogen 37.3 (*)     Creatinine 0.97      GFR Estimate >90      Calcium 8.7      Glucose 128 (*)    CBC WITH PLATELETS AND DIFFERENTIAL - Abnormal    WBC Count 11.6 (*)     RBC Count 3.76 (*)     Hemoglobin 10.8 (*)     Hematocrit 33.1 (*)     MCV 88      MCH 28.7      MCHC 32.6      RDW 13.1      Platelet Count 223      % Neutrophils 68      % Lymphocytes 26      % Monocytes 6      % Eosinophils 0      % Basophils 0      % Immature Granulocytes 0      NRBCs per 100 WBC 0      Absolute Neutrophils 7.9      Absolute Lymphocytes 3.0      Absolute Monocytes 0.7      Absolute Eosinophils 0.0      Absolute Basophils 0.0      Absolute Immature Granulocytes 0.1      Absolute NRBCs 0.0     HEPATIC FUNCTION PANEL - Abnormal    Protein Total 6.0 (*)     Albumin 3.9      Bilirubin Total 0.3      Alkaline Phosphatase 53      AST 16      ALT 25      Bilirubin Direct <0.20     HEMOGLOBIN - Abnormal    Hemoglobin 9.9 (*)    HEMOGLOBIN A1C - Normal    Hemoglobin A1C 5.2     BASIC METABOLIC PANEL   CBC WITH PLATELETS   CBC WITH PLATELETS   TYPE AND SCREEN, ADULT    ABO/RH(D) B POS      Antibody Screen Negative      SPECIMEN EXPIRATION DATE [id number]     ABO/RH TYPE AND SCREEN        EKG: Sinus tachycardia with nonspecific ST and T wave changes.          Triage note reviewed:Pt here with black stools that started this afternoon. He is feeling fatigued, pale and clammy. No blood  thinners, no NSAIDS, no iron supplements.Denies abdominal pain. This has not happened before.     Triage Assessment (Adult)       Row Name 10/29/23 2004          Triage Assessment    Airway WDL WDL        Respiratory WDL    Respiratory WDL WDL        Skin Circulation/Temperature WDL    Skin Circulation/Temperature WDL X;temperature     Skin Temperature cool  clammy        Cardiac WDL    Cardiac WDL X;rhythm     Pulse Rate & Regularity tachycardic                         History:  Supplemental history from: Documented in chart, if applicable and Family Member/Significant Other patient's wife  External Record(s) reviewed: Documented in chart, if applicable.    Work Up:  Chart documentation includes differential considered and any EKGs or imaging independently interpreted by provider, where specified.  In additional to work up documented, I considered the following work up: N/A    External consultation:  Discussion of management with another provider: Documented in chart, if applicable and Gastroenterology and Hospitalist    Complicating factors:  Care impacted by chronic illness: N/A  Care affected by social determinants of health: N/A    Disposition considerations: Admit.    At the conclusion of the encounter I discussed the results of all of the tests and the disposition. The questions were answered. The patient or family acknowledged understanding and was agreeable with the care plan.    TOTAL CRITICAL CARE TIME (EXCLUDING PROCEDURES): Not applicable    PROCEDURES:   None    EMERGENCY DEPARTMENT COURSE   8:28 PM I met with the patient to gather history and to perform my initial exam.  We discussed treatment options and the plan for care while in the Emergency Department.  10:41 PM I discussed the case with Resident hospitalist, Dr Mauro, who accepts the patient    10:48 PM Spoke with [facility].     ED INTERVENTIONS     Medications   lactated ringers infusion ( Intravenous Rate/Dose Verify 10/30/23 0251)    pantoprazole (PROTONIX) 80 mg in sodium chloride 0.9 % 100 mL infusion (8 mg/hr Intravenous Rate/Dose Verify 10/30/23 0250)   melatonin tablet 1 mg (has no administration in time range)   acetaminophen (TYLENOL) tablet 650 mg (has no administration in time range)     Or   acetaminophen (TYLENOL) Suppository 650 mg (has no administration in time range)   senna-docusate (SENOKOT-S/PERICOLACE) 8.6-50 MG per tablet 1 tablet (has no administration in time range)     Or   senna-docusate (SENOKOT-S/PERICOLACE) 8.6-50 MG per tablet 2 tablet (has no administration in time range)   polyethylene glycol (MIRALAX) Packet 17 g (has no administration in time range)   ondansetron (ZOFRAN ODT) ODT tab 4 mg (has no administration in time range)     Or   ondansetron (ZOFRAN) injection 4 mg (has no administration in time range)   prochlorperazine (COMPAZINE) injection 10 mg (has no administration in time range)     Or   prochlorperazine (COMPAZINE) tablet 10 mg (has no administration in time range)     Or   prochlorperazine (COMPAZINE) suppository 25 mg (has no administration in time range)   sodium chloride 0.9% BOLUS 1,000 mL (0 mLs Intravenous Stopped 10/29/23 2201)   iopamidol (ISOVUE-370) solution 100 mL (90 mLs Intravenous $Given 10/29/23 2134)   lactated ringers BOLUS 1,000 mL (0 mLs Intravenous Stopped 10/30/23 0243)       DISCHARGE MEDICATIONS        Review of your medicines        UNREVIEWED medicines. Ask your doctor about these medicines        Dose / Directions   ibuprofen 200 MG tablet  Commonly known as: ADVIL/MOTRIN      Dose: 400 mg  Take 400 mg by mouth every 4 hours as needed for pain  Refills: 0                INFORMATION SOURCE AND LIMITATIONS    History/Exam limitations: none   Patient information was obtained from: the patient   Use of : N/A    HISTORY OF PRESENT ILLNESS   Gretel Gross is a 32 year old year old male with no recorded relevant past history, who presents to this ED via walk in for  evaluation of rectal bleeding.    The patient reports the onset of black stool this morning. He has had four episodes of black stool since then, and this evening saw the onset of dizziness, prompting presentation to the ED. He notes that he drinks a few beers a couple of times a week. He denies using ibuprofen. The patient denies abdominal pain, lightheadedness, and any other symptoms or complaints at this time.     REVIEW OF SYSTEMS:   All other systems reviewed and are negative except as noted above in HPI.    PATIENT HISTORY   History reviewed. No pertinent past medical history.  Patient Active Problem List   Diagnosis    Gastrointestinal hemorrhage with melena     History reviewed. No pertinent surgical history.    No Known Allergies    OUTPATIENT MEDICATIONS     New Prescriptions    No medications on file      Vitals:    10/30/23 0030 10/30/23 0100 10/30/23 0200 10/30/23 0243   BP: 124/71 109/71  115/75   Pulse: 110 108 102 103   Resp: 21 17 19 16   Temp:       TempSrc:       SpO2: 100% 100% 96% 97%   Weight:       Height:           Physical Exam   Constitutional: Oriented to person, place, and time. Appears well-developed and well-nourished.   Cardiovascular: Tachycardic, regular rhythm and normal heart sounds.    Pulmonary/Chest: Normal effort  and breath sounds normal.   Abdominal: Soft. Bowel sounds are normal. Non tender.   : Rectal exam showed maroon colored blood.     Skin: Skin is warm and dry.   Psychiatric: Normal mood and affect. Behavior is normal. Thought content normal.     DIAGNOSTICS    LABORATORY FINDINGS (REVIEWED AND INTERPRETED):  Labs Ordered and Resulted from Time of ED Arrival to Time of ED Departure   BASIC METABOLIC PANEL - Abnormal       Result Value    Sodium 139      Potassium 4.1      Chloride 105      Carbon Dioxide (CO2) 21 (*)     Anion Gap 13      Urea Nitrogen 37.3 (*)     Creatinine 0.97      GFR Estimate >90      Calcium 8.7      Glucose 128 (*)    CBC WITH PLATELETS AND  DIFFERENTIAL - Abnormal    WBC Count 11.6 (*)     RBC Count 3.76 (*)     Hemoglobin 10.8 (*)     Hematocrit 33.1 (*)     MCV 88      MCH 28.7      MCHC 32.6      RDW 13.1      Platelet Count 223      % Neutrophils 68      % Lymphocytes 26      % Monocytes 6      % Eosinophils 0      % Basophils 0      % Immature Granulocytes 0      NRBCs per 100 WBC 0      Absolute Neutrophils 7.9      Absolute Lymphocytes 3.0      Absolute Monocytes 0.7      Absolute Eosinophils 0.0      Absolute Basophils 0.0      Absolute Immature Granulocytes 0.1      Absolute NRBCs 0.0     HEPATIC FUNCTION PANEL - Abnormal    Protein Total 6.0 (*)     Albumin 3.9      Bilirubin Total 0.3      Alkaline Phosphatase 53      AST 16      ALT 25      Bilirubin Direct <0.20     HEMOGLOBIN - Abnormal    Hemoglobin 9.9 (*)    HEMOGLOBIN A1C - Normal    Hemoglobin A1C 5.2     BASIC METABOLIC PANEL   CBC WITH PLATELETS   CBC WITH PLATELETS   TYPE AND SCREEN, ADULT    ABO/RH(D) B POS      Antibody Screen Negative      SPECIMEN EXPIRATION DATE [id number]     ABO/RH TYPE AND SCREEN         IMAGING (REVIEWED AND INTERPRETED):  CTA Abdomen Pelvis with Contrast   Final Result   IMPRESSION:   1.  No evidence for abdominal aortic aneurysm. No significant atherosclerotic disease involving the abdominal aorta or the branch vessels. No hemodynamically significant stenosis involving the branch arteries of the abdominal aorta and the iliac arteries    in the pelvis. No CTA evidence for active GI extravasation.      2.  Nonspecific nonobstructive bowel gas pattern. Appendix visualized and appears within normal limits. No evidence for colitis or diverticulitis.            ECG (REVIEWED AND INTERPRETED):   ECG:   Performed at: 20:50  HR:  131 bpm  Rhythm: Sinus  Axis: 114  QRS duration: 70  QTC: 404  ST changes: No ST segment elevation or depression, no T wave inversion,No Q wave  Interpretation: Sinus tachycardia with nonspecific ST and T wave changes  No prior for  comparison    I have reviewed the patient's ECG, with comments made as listed above. Please see scanned image for full interpretation.         I, Laurie Toma, am serving as a scribe to document services personally performed by Boris Lipscomb D.O., based on my observation and the provider s statements to me.    I, Boris Lipscomb D.O., attest that Laurie Chua is acting in a scribe capacity, has observed my performance of the services and has documented them in accordance with my direction.    Boris Lipscomb D.O.  EMERGENCY MEDICINE   10/29/23  [facility] EMERGENCY DEPARTMENT  [address] [place] [id number]  [phone]  Dept: [phone]      Boris Lipscomb,   10/30/23 0306

## 2023-10-30 NOTE — ED NOTES
Bed: [place] [address]  Expected date:   Expected time:   Means of arrival:   Comments:  Hold for room 20

## 2023-10-30 NOTE — PLAN OF CARE
"  Problem: Adult Inpatient Plan of Care  Goal: Patient-Specific Goal (Individualized)  Description: You can add care plan individualizations to a care plan. Examples of Individualization might be:  \"Parent requests to be called daily at 9am for status\", \"I have a hard time hearing out of my right ear\", or \"Do not touch me to wake me up as it startles  me\".  Outcome: Progressing     Problem: Anemia  Goal: Anemia Symptom Improvement  Outcome: Not Progressing  Intervention: Monitor and Manage Anemia  Recent Flowsheet Documentation  Taken 10/30/2023 0815 by Veronique Foley, RN  Safety Promotion/Fall Prevention:   activity supervised   lighting adjusted   nonskid shoes/slippers when out of bed   room near nurse's station   safety round/check completed   supervised activity   clutter free environment maintained   Goal Outcome Evaluation:      Plan of Care Reviewed With: patient      Patient heart rate high 90's to tachy in the low 100's. Hemoglobin slowly dropping, last hemoglobin was 8.5 this morning. Recent hemoglobin around 1400, expect results soon.           "

## 2023-10-30 NOTE — MEDICATION SCRIBE - ADMISSION MEDICATION HISTORY
Medication Scribe Admission Medication History    Admission medication history is complete. The information provided in this note is only as accurate as the sources available at the time of the update.    Information Source(s): Patient via in-person    Pertinent Information: Patient states that currently he is not taking any Rx's except otc Ibuprofen prn    Changes made to PTA medication list:  Added: Ibuprofen 200 mg tablet(per patient)  Deleted: None  Changed: None    Medication Affordability:  Not including over the counter (OTC) medications, was there a time in the past 3 months when you did not take your medications as prescribed because of cost?: No    Allergies reviewed with patient and updates made in EHR: yes    Medication History Completed By: Cleopatra Cerda 10/29/2023 10:54 PM    PTA Med List   Medication Sig Last Dose    ibuprofen (ADVIL/MOTRIN) 200 MG tablet Take 400 mg by mouth every 4 hours as needed for pain Unknown at prn

## 2023-10-30 NOTE — ED TRIAGE NOTES
Pt here with black stools that started this afternoon. He is feeling fatigued, pale and clammy. No blood thinners, no NSAIDS, no iron supplements.Denies abdominal pain. This has not happened before.     Triage Assessment (Adult)       Row Name 10/29/23 2004          Triage Assessment    Airway WDL WDL        Respiratory WDL    Respiratory WDL WDL        Skin Circulation/Temperature WDL    Skin Circulation/Temperature WDL X;temperature     Skin Temperature cool  clammy        Cardiac WDL    Cardiac WDL X;rhythm     Pulse Rate & Regularity tachycardic

## 2023-10-31 LAB
BLD PROD TYP BPU: NORMAL
BLOOD COMPONENT TYPE: NORMAL
CODING SYSTEM: NORMAL
CROSSMATCH: NORMAL
HGB BLD-MCNC: 6.6 G/DL (ref 13.3–17.7)
HGB BLD-MCNC: 8 G/DL (ref 13.3–17.7)
ISSUE DATE AND TIME: NORMAL
PATH REPORT.COMMENTS IMP SPEC: NORMAL
PATH REPORT.COMMENTS IMP SPEC: NORMAL
PATH REPORT.FINAL DX SPEC: NORMAL
PATH REPORT.GROSS SPEC: NORMAL
PATH REPORT.MICROSCOPIC SPEC OTHER STN: NORMAL
PATH REPORT.RELEVANT HX SPEC: NORMAL
PHOTO IMAGE: NORMAL
UNIT ABO/RH: NORMAL
UNIT NUMBER: NORMAL
UNIT STATUS: NORMAL
UNIT TYPE ISBT: 7300

## 2023-10-31 PROCEDURE — 250N000011 HC RX IP 250 OP 636: Mod: JZ | Performed by: INTERNAL MEDICINE

## 2023-10-31 PROCEDURE — 36415 COLL VENOUS BLD VENIPUNCTURE: CPT

## 2023-10-31 PROCEDURE — C9113 INJ PANTOPRAZOLE SODIUM, VIA: HCPCS | Mod: JZ | Performed by: INTERNAL MEDICINE

## 2023-10-31 PROCEDURE — 99233 SBSQ HOSP IP/OBS HIGH 50: CPT | Mod: GC

## 2023-10-31 PROCEDURE — 85018 HEMOGLOBIN: CPT

## 2023-10-31 PROCEDURE — 120N000001 HC R&B MED SURG/OB

## 2023-10-31 PROCEDURE — P9016 RBC LEUKOCYTES REDUCED: HCPCS

## 2023-10-31 PROCEDURE — 250N000013 HC RX MED GY IP 250 OP 250 PS 637: Performed by: INTERNAL MEDICINE

## 2023-10-31 RX ORDER — METRONIDAZOLE 250 MG/1
250 TABLET ORAL 4 TIMES DAILY
Status: DISCONTINUED | OUTPATIENT
Start: 2023-10-31 | End: 2023-11-01 | Stop reason: HOSPADM

## 2023-10-31 RX ORDER — TETRACYCLINE HYDROCHLORIDE 250 MG/1
500 CAPSULE ORAL 4 TIMES DAILY
Status: DISCONTINUED | OUTPATIENT
Start: 2023-10-31 | End: 2023-11-01 | Stop reason: HOSPADM

## 2023-10-31 RX ORDER — BISMUTH SUBSALICYLATE 262 MG/1
524 TABLET, CHEWABLE ORAL
Status: DISCONTINUED | OUTPATIENT
Start: 2023-10-31 | End: 2023-11-01 | Stop reason: HOSPADM

## 2023-10-31 RX ADMIN — METRONIDAZOLE 250 MG: 250 TABLET ORAL at 16:31

## 2023-10-31 RX ADMIN — METRONIDAZOLE 250 MG: 250 TABLET ORAL at 21:03

## 2023-10-31 RX ADMIN — PANTOPRAZOLE SODIUM 40 MG: 40 INJECTION, POWDER, FOR SOLUTION INTRAVENOUS at 09:16

## 2023-10-31 RX ADMIN — TETRACYCLINE HYDROCHLORIDE 500 MG: 250 CAPSULE ORAL at 16:31

## 2023-10-31 RX ADMIN — BISMUTH SUBSALICYLATE 524 MG: 262 TABLET, CHEWABLE ORAL at 16:29

## 2023-10-31 RX ADMIN — PANTOPRAZOLE SODIUM 40 MG: 40 INJECTION, POWDER, FOR SOLUTION INTRAVENOUS at 21:03

## 2023-10-31 RX ADMIN — TETRACYCLINE HYDROCHLORIDE 500 MG: 250 CAPSULE ORAL at 21:03

## 2023-10-31 RX ADMIN — BISMUTH SUBSALICYLATE 524 MG: 262 TABLET, CHEWABLE ORAL at 21:57

## 2023-10-31 ASSESSMENT — ACTIVITIES OF DAILY LIVING (ADL)
ADLS_ACUITY_SCORE: 37

## 2023-10-31 NOTE — PLAN OF CARE
Problem: Gastrointestinal Bleeding  Goal: Hemostasis  Intervention: Manage Gastrointestinal Bleeding  Recent Flowsheet Documentation  Taken 10/31/2023 1300 by Inés Salas, RN  Bleeding Management: blood products administered  Stabilization Measures: blood products administered  Environmental Support:   calm environment promoted   caregiver consistency promoted   distractions minimized   rest periods encouraged  Taken 10/31/2023 0900 by Inés Salas, RN  Bleeding Management: blood products administered  Stabilization Measures: blood products administered  Environmental Support:   calm environment promoted   caregiver consistency promoted   distractions minimized   rest periods encouraged       Goal Outcome Evaluation: Patient's hemoglobin low this morning, order received to transfuse one unit PRBCS. Recheck hemoglobin at 1500.

## 2023-10-31 NOTE — PROGRESS NOTES
Pt alert and oriented. Got up once and felt like he may have a BM. Only had black tarry smear. BP running soft and HR slightly tachycardic. Hgb currently 7.9, plan for monitoring overnight. Tolerating regular diet. Wife at bedside, supportive.

## 2023-10-31 NOTE — PLAN OF CARE
Problem: Adult Inpatient Plan of Care  Goal: Optimal Comfort and Wellbeing  Outcome: Progressing     Problem: Anemia  Goal: Anemia Symptom Improvement  Outcome: Progressing  Intervention: Monitor and Manage Anemia  Recent Flowsheet Documentation  Taken 10/31/2023 0146 by Ann Pink RN  Safety Promotion/Fall Prevention: activity supervised  Taken 10/30/2023 2056 by Ann Pink RN  Safety Promotion/Fall Prevention: activity supervised     Problem: Fall Injury Risk  Goal: Absence of Fall and Fall-Related Injury  Outcome: Progressing  Intervention: Identify and Manage Contributors  Recent Flowsheet Documentation  Taken 10/31/2023 0146 by Ann Pink RN  Medication Review/Management:   medications reviewed   high-risk medications identified  Taken 10/30/2023 2056 by Ann Pink RN  Medication Review/Management:   medications reviewed   high-risk medications identified  Intervention: Promote Injury-Free Environment  Recent Flowsheet Documentation  Taken 10/31/2023 0146 by Ann Pink RN  Safety Promotion/Fall Prevention: activity supervised  Taken 10/30/2023 2056 by Ann Pink RN  Safety Promotion/Fall Prevention: activity supervised   Goal Outcome Evaluation:       Pt A&Ox4, denies pain throughout shift. Pt remains on remote tele, NSR. Tachycardic 100s. Soft Bps. No signs of bleeding noted. Per pt, only had 1 smear BM at 2000, black in color. BS active, denies tenderness. Pt up w/ SBA to BA for safety d/t fatigue. Pt has R PIV, saline locked. Hgb: 7.9, recheck in AM.

## 2023-10-31 NOTE — PROGRESS NOTES
"[facility] Progress Note       SUBJECTIVE:  Patient is eating fine, no abdominal pain. He noted black stool with wiping this morning but no actual BM yet today. He is still fatigued.        OBJECTIVE:  BP 90/53   Pulse 109   Temp 98  F (36.7  C) (Oral)   Resp 20   Ht 1.702 m (5' 7\")   Wt 99.8 kg (220 lb)   SpO2 99%   BMI 34.46 kg/m    Temp (24hrs), Av.4  F (36.9  C), Min:98  F (36.7  C), Max:98.8  F (37.1  C)    Patient Vitals for the past 72 hrs:   Weight   10/29/23 2003 99.8 kg (220 lb)       Intake/Output Summary (Last 24 hours) at 10/31/2023 0840  Last data filed at 10/30/2023 1400  Gross per 24 hour   Intake 220 ml   Output --   Net 220 ml        PHYSICAL EXAM  GEN: NAD, male appears stated age lying in bed  HRT: no LE edema  RESP: unlabored  ABD: soft, nontender  SKIN: No rash or jaundice      Additional Data:  I have reviewed the patient's new clinical lab results:     Recent Labs   Lab Test 10/31/23  0704 10/30/23  1443 10/30/23  0703 10/30/23  0331 10/29/23  2250 10/29/23  2053   WBC  --   --  7.9 9.5  --  11.6*   HGB 6.6* 7.9* 8.5* 9.0*   < > 10.8*   MCV  --   --  88 87  --  88   PLT  --   --  166 177  --  223    < > = values in this interval not displayed.     Recent Labs   Lab Test 10/30/23  0703 10/29/23  2053   POTASSIUM 3.8 4.1   CHLORIDE 106 105   CO2 22 21*   BUN 23.6* 37.3*   ANIONGAP 11 13     Recent Labs   Lab Test 10/29/23  2053   ALBUMIN 3.9   BILITOTAL 0.3   ALT 25   AST 16         Imaging results:  CTA abdomen/pelvis 10/29/23:  IMPRESSION:  1.  No evidence for abdominal aortic aneurysm. No significant atherosclerotic disease involving the abdominal aorta or the branch vessels. No hemodynamically significant stenosis involving the branch arteries of the abdominal aorta and the iliac arteries in the pelvis. No CTA evidence for active GI extravasation.  2.  Nonspecific nonobstructive bowel gas pattern. Appendix visualized and appears within normal limits. No evidence for " colitis or diverticulitis.    Procedure results:  EGD 10/30/23 ([facility]):  Findings:       No blood present during examination.        The examined esophagus was normal.        Scattered moderate inflammation characterized by erythema was found in        the gastric antrum. Biopsies were taken with a cold forceps for        Helicobacter pylori testing.        One non-bleeding cratered duodenal ulcer with no stigmata of bleeding        was found in the duodenal bulb. The lesion was 7 mm in largest dimension.     Impression:    - Normal esophagus.                          - Gastritis. Biopsied.                          - Non-bleeding duodenal ulcer with no stigmata of                          bleeding.   Recommendation:        - Await pathology results. (pending)                         - Use Protonix (pantoprazole) 40 mg IV daily. Upon                          discharge, will need omeprazole 20 mg daily for 2                          months                          - okay to advance diet. Low risk for rebleeding based                          upon endoscopic classification. Can discharge once                          hemoglobin stabilizes                          - regular diet      IMPRESSION:  Duodenal ulcer  Upper GI bleed  33 y/o male without significant PMH admitted 10/29 for upper GI bleed after presenting with melena and associated dizziness, fatigue and heart palpitations. CTA negative for active GI bleed. EGD 10/30 with one non-bleeding cratered duodenal ulcer and scattered moderate inflammation in the gastric antrum. Pathology pending. Hemoglobin continues to drop and is 6.6 this AM, stools continue to be black.     PLAN:  - Await EGD pathology, treat H. Pylori if positive  - Continue IV PPI through today  - Regular diet as tolerated  - Hemoglobin monitoring and transfusion per medicine      (Dr. Ha)  Simran Chappell PA-C  [facility]  10/31/2023 8:40 AM  [phone] (office)    30  minutes of total time was spent providing patient care, including patient evaluation, reviewing documentation/test results, , and documentation.  ________________________________________________________________________

## 2023-10-31 NOTE — PROGRESS NOTES
Resident/Fellow Attestation   I, DERRICK ARIAS MD, was present with the medical student who participated in the service and in the documentation of the note.  I have verified the history and personally performed the physical exam and medical decision making.  I agree with the assessment and plan of care as documented in the note.        DERRICK ARIAS MD  PGY1  Date of Service (when I saw the patient): 10/31/23    [facility]    Progress Note - Hospitalist Service       Date of Admission:  10/29/2023    Assessment & Plan   Gretel Gross is a 32 year old male admitted on 10/29/2023. He has no significant past medical history and is admitted for melena with suspected GI bleed.     Melena  Concern for upper GI bleed  Anemia: 10.8 -> 6.6   Sinus Tachycardia  Patient presented with 1 day history of melena, shortness of breath, fatigue, palpitations. Found to be tachycardic to 115 and anemic with a Hgb of 10.8 and elevated BUN on arrival. Unsure of etiology of upper GI bleed; patient notes no history of bleeding, no family history of GI bleeds or malignancies. Did note increased alcohol use in the past 1-2 months due to life stressors and family deaths; reports 3-4 drinks daily. No caffeine or other substances. EGD 10/30 without evidence of ongoing bleeding; moderate gastritis, non-bleeding duodenal ulcer with no stigmata of bleeding. Was initially cleared by GI for discharge if Hgb stabilized. Hgb continued to decrease 10/30 evening and 10/31 AM was 6.6; consented for blood and received 1 unit pRBCs.   - Recheck Hgb @1400   - Protonix 40mg IV BID  - GI recs 10/31:    - Continue IV protonix   - Regular diet   - Await EGD pathology, treat H pylori if positive    - Hgb monitoring and transfuse per medicine  - Follow up alcohol use; patient appears motivated to quit drinking    Leukocytosis, improved  WBC elevated to 11.6 on arrival, improved to 7.9.     Obesity  Hyperglycemia  Glucose of 128 on  "admission but with A1C of 5.2.     Healthcare Maintenance  No PCP, will discuss follow up closer to discharge.         Diet: Regular Diet Adult    DVT Prophylaxis: Pneumatic Compression Devices  South Catheter: Not present  Fluids: PO   Lines: None     Cardiac Monitoring: ACTIVE order. Indication: Acute GIB - anemia - sinus tachycardia  Code Status: Full Code        Disposition Plan      Expected Discharge Date: 11/1/23              The patient's care was discussed with MD OPAL Abreu, MS4  Medical Student  Hospitalist Service  [facility]  Securely message with [employer] (more info)  Text page via [facility] Paging/Directory   ______________________________________________________________________    Interval History   Patient becoming symptomatic with anemia, states that he is dizzy with mild headache and lightheadedness when standing only, otherwise stable. Remains borderline tachycardic with soft BP. One black BM last evening. No abdominal pain or nausea, appetite is good.     Physical Exam   Vital Signs: Temp: 98  F (36.7  C) Temp src: Oral BP: 90/53 Pulse: 97   Resp: 18 SpO2: 97 % O2 Device: None (Room air)    Weight: 220 lbs 0 oz    Constitutional: awake, alert, cooperative, no apparent distress, and appears stated age  Respiratory: No increased work of breathing, good air exchange, clear to auscultation bilaterally, no crackles or wheezing  Cardiovascular: Normal apical impulse, regular rate and rhythm, normal S1 and S2, no S3 or S4, and no murmur noted  GI: No scars, normal bowel sounds, soft, non-distended, non-tender, no masses palpated, no hepatosplenomegaly    Data   Lab Results   Component Value Date    WBC 7.9 10/30/2023     Lab Results   Component Value Date    RBC 2.90 10/30/2023     Lab Results   Component Value Date    HGB 6.6 10/31/2023     Lab Results   Component Value Date    HCT 25.5 10/30/2023     No components found for: \"MCT\"  Lab Results   Component " Value Date    MCV 88 10/30/2023     Lab Results   Component Value Date    MCH 29.3 10/30/2023     Lab Results   Component Value Date    MCHC 33.3 10/30/2023     Lab Results   Component Value Date    RDW 13.2 10/30/2023     Lab Results   Component Value Date     10/30/2023

## 2023-11-01 VITALS
BODY MASS INDEX: 31.11 KG/M2 | OXYGEN SATURATION: 100 % | SYSTOLIC BLOOD PRESSURE: 120 MMHG | TEMPERATURE: 99.2 F | DIASTOLIC BLOOD PRESSURE: 76 MMHG | RESPIRATION RATE: 16 BRPM | WEIGHT: 198.19 LBS | HEIGHT: 67 IN | HEART RATE: 96 BPM

## 2023-11-01 DIAGNOSIS — K92.1 GASTROINTESTINAL HEMORRHAGE WITH MELENA: Primary | ICD-10-CM

## 2023-11-01 LAB
HGB BLD-MCNC: 7.3 G/DL (ref 13.3–17.7)
HGB BLD-MCNC: 8.2 G/DL (ref 13.3–17.7)

## 2023-11-01 PROCEDURE — 85018 HEMOGLOBIN: CPT | Performed by: FAMILY MEDICINE

## 2023-11-01 PROCEDURE — 99238 HOSP IP/OBS DSCHRG MGMT 30/<: CPT | Mod: GC

## 2023-11-01 PROCEDURE — 36415 COLL VENOUS BLD VENIPUNCTURE: CPT

## 2023-11-01 PROCEDURE — 85018 HEMOGLOBIN: CPT

## 2023-11-01 PROCEDURE — 250N000011 HC RX IP 250 OP 636: Mod: JZ | Performed by: INTERNAL MEDICINE

## 2023-11-01 PROCEDURE — 36415 COLL VENOUS BLD VENIPUNCTURE: CPT | Performed by: FAMILY MEDICINE

## 2023-11-01 PROCEDURE — C9113 INJ PANTOPRAZOLE SODIUM, VIA: HCPCS | Mod: JZ | Performed by: INTERNAL MEDICINE

## 2023-11-01 PROCEDURE — 250N000013 HC RX MED GY IP 250 OP 250 PS 637: Performed by: INTERNAL MEDICINE

## 2023-11-01 RX ORDER — TETRACYCLINE HYDROCHLORIDE 500 MG/1
500 CAPSULE ORAL 4 TIMES DAILY
Qty: 52 CAPSULE | Refills: 0 | Status: SHIPPED | OUTPATIENT
Start: 2023-11-01 | End: 2023-11-14

## 2023-11-01 RX ORDER — METRONIDAZOLE 250 MG/1
250 TABLET ORAL 4 TIMES DAILY
Qty: 52 TABLET | Refills: 0 | Status: SHIPPED | OUTPATIENT
Start: 2023-11-01 | End: 2023-11-14

## 2023-11-01 RX ORDER — BISMUTH SUBSALICYLATE 262 MG/1
524 TABLET, CHEWABLE ORAL
Qty: 104 TABLET | Refills: 0 | Status: SHIPPED | OUTPATIENT
Start: 2023-11-01 | End: 2023-11-14

## 2023-11-01 RX ADMIN — TETRACYCLINE HYDROCHLORIDE 500 MG: 250 CAPSULE ORAL at 12:25

## 2023-11-01 RX ADMIN — BISMUTH SUBSALICYLATE 524 MG: 262 TABLET, CHEWABLE ORAL at 10:53

## 2023-11-01 RX ADMIN — TETRACYCLINE HYDROCHLORIDE 500 MG: 250 CAPSULE ORAL at 16:11

## 2023-11-01 RX ADMIN — PANTOPRAZOLE SODIUM 40 MG: 40 INJECTION, POWDER, FOR SOLUTION INTRAVENOUS at 08:20

## 2023-11-01 RX ADMIN — BISMUTH SUBSALICYLATE 524 MG: 262 TABLET, CHEWABLE ORAL at 16:11

## 2023-11-01 RX ADMIN — METRONIDAZOLE 250 MG: 250 TABLET ORAL at 08:20

## 2023-11-01 RX ADMIN — METRONIDAZOLE 250 MG: 250 TABLET ORAL at 16:11

## 2023-11-01 RX ADMIN — METRONIDAZOLE 250 MG: 250 TABLET ORAL at 12:25

## 2023-11-01 RX ADMIN — TETRACYCLINE HYDROCHLORIDE 500 MG: 250 CAPSULE ORAL at 08:20

## 2023-11-01 ASSESSMENT — ACTIVITIES OF DAILY LIVING (ADL)
ADLS_ACUITY_SCORE: 37
ADLS_ACUITY_SCORE: 20
ADLS_ACUITY_SCORE: 37

## 2023-11-01 NOTE — PLAN OF CARE
Goal Outcome Evaluation:      Plan of Care Reviewed With: patient    Overall Patient Progress: improving    Outcome Evaluation: Patient reports one black stool today. Hgb 7.3 this am, recheck around 1600.

## 2023-11-01 NOTE — PLAN OF CARE
Problem: Adult Inpatient Plan of Care  Goal: Absence of Hospital-Acquired Illness or Injury  Outcome: Progressing  Intervention: Identify and Manage Fall Risk  Recent Flowsheet Documentation  Taken 11/1/2023 0000 by Yoel Weller RN  Safety Promotion/Fall Prevention:   assistive device/personal items within reach   clutter free environment maintained  Taken 10/31/2023 2045 by Yoel Weller RN  Safety Promotion/Fall Prevention:   assistive device/personal items within reach   clutter free environment maintained  Intervention: Prevent Skin Injury  Recent Flowsheet Documentation  Taken 11/1/2023 0000 by Yoel Weller RN  Skin Protection: tubing/devices free from skin contact  Device Skin Pressure Protection: tubing/devices free from skin contact  Taken 10/31/2023 2045 by Yoel Weller RN  Skin Protection: tubing/devices free from skin contact  Device Skin Pressure Protection: tubing/devices free from skin contact  Intervention: Prevent Infection  Recent Flowsheet Documentation  Taken 11/1/2023 0000 by Yoel Weller RN  Infection Prevention: hand hygiene promoted  Taken 10/31/2023 2045 by Yoel Weller RN  Infection Prevention: hand hygiene promoted     Problem: Anemia  Goal: Anemia Symptom Improvement  Outcome: Progressing  Intervention: Monitor and Manage Anemia  Recent Flowsheet Documentation  Taken 11/1/2023 0000 by Yoel Weller RN  Safety Promotion/Fall Prevention:   assistive device/personal items within reach   clutter free environment maintained  Taken 10/31/2023 2045 by Yoel Weller RN  Safety Promotion/Fall Prevention:   assistive device/personal items within reach   clutter free environment maintained     Problem: Gastrointestinal Bleeding  Goal: Optimal Coping with Acute Illness  Outcome: Progressing  Intervention: Optimize Psychosocial Response  Recent Flowsheet Documentation  Taken 11/1/2023 0000 by Yoel Weller RN  Supportive Measures:   active listening utilized   decision-making  supported   self-care encouraged   self-reflection promoted   self-responsibility promoted   verbalization of feelings encouraged  Taken 10/31/2023 2045 by Yoel Weller, RN  Supportive Measures:   active listening utilized   decision-making supported   self-care encouraged   self-reflection promoted   self-responsibility promoted   verbalization of feelings encouraged  Goal: Hemostasis  Outcome: Progressing  Intervention: Manage Gastrointestinal Bleeding  Recent Flowsheet Documentation  Taken 11/1/2023 0000 by Yoel Weller, RN  Environmental Support:   calm environment promoted   caregiver consistency promoted   distractions minimized   rest periods encouraged  Taken 10/31/2023 2045 by Yoel Weller, RN  Environmental Support:   calm environment promoted   caregiver consistency promoted   distractions minimized   rest periods encouraged   Goal Outcome Evaluation:  VSS on RA, A&Ox4, denies pain. Tele NSR and at times tachy. Last Hbg 8.0 with recheck at 0600. Red diet and ind. Pt sleeping between cares.

## 2023-11-01 NOTE — PLAN OF CARE
Goal Outcome Evaluation:         Problem: Anemia  Goal: Anemia Symptom Improvement  Outcome: Progressing     Problem: Fall Injury Risk  Goal: Absence of Fall and Fall-Related Injury  Outcome: Progressing     Problem: Gastrointestinal Bleeding  Goal: Hemostasis  Outcome: Progressing     Problem: Adult Inpatient Plan of Care  Goal: Optimal Comfort and Wellbeing  Outcome: Adequate for Care Transition     Problem: Adult Inpatient Plan of Care  Goal: Absence of Hospital-Acquired Illness or Injury  Intervention: Prevent Skin Injury  Recent Flowsheet Documentation  Taken 10/31/2023 1700 by Jj Ramon RN  Body Position: position changed independently  Skin Protection: tubing/devices free from skin contact  Device Skin Pressure Protection: tubing/devices free from skin contact  Intervention: Prevent Infection  Recent Flowsheet Documentation  Taken 10/31/2023 1700 by Jj Ramon, RN  Infection Prevention: hand hygiene promoted     Patient is A/O x4. VSS. Patient denies pain. He was relieved to hear about the bump from 6.6 Hgb to 8.0 after today's transfusion. Patient up to bathroom, independently.     Appetite good.     Spouse at bedside.

## 2023-11-01 NOTE — PROGRESS NOTES
Care Management Follow Up    Length of Stay (days): 2    Expected Discharge Date: 11/01/2023    Concerns to be Addressed:   Alteration in gastrointestinal function, GI following. Monitoring labs, transfuse as ordered.   Patient plan of care discussed at interdisciplinary rounds: Yes    Anticipated Discharge Disposition:  Discharge goal is home.      Anticipated Discharge Services:  To be determined.   Anticipated Discharge DME:  None anticipated.    Patient/family educated on Medicare website which has current facility and service quality ratings:   NA  Education Provided on the Discharge Plan:   Per team  Patient/Family in Agreement with the Plan:   Yes    Referrals Placed by CM/SW:   None  Private pay costs discussed: Not applicable     Additional Information:  Per chart review, patient is independent with activities of daily living at baseline.    No care management needs identified at this time but CM will continue to monitor progression of care, review team recommendations and provide discharge planning assist as needed.      Laurie Nelson RN

## 2023-11-01 NOTE — PROGRESS NOTES
Resident/Fellow Attestation   I, ALEXANDRE ARIAS MD, was present with the medical student who participated in the service and in the documentation of the note.  I have verified the history and personally performed the physical exam and medical decision making.  I agree with the assessment and plan of care as documented in the note.      Alexandre Arias MD  [facility] [place] [facility], PGY-1     [facility]    Progress Note - Hospitalist Service       Date of Admission:  10/29/2023    Assessment & Plan   Gretel Gross is a 32 year old male admitted on 10/29/2023. He has no significant past medical history and is admitted for melena with suspected GI bleed.     Melena  Concern for upper GI bleed  Anemia: 10.8 -> 6.6   Sinus Tachycardia  Patient presented with 1 day history of melena, shortness of breath, fatigue, palpitations. Found to be tachycardic to 115 and anemic with a Hgb of 10.8 and elevated BUN on arrival. Unsure of etiology of upper GI bleed; patient notes no history of bleeding, no family history of GI bleeds or malignancies. Did note increased alcohol use in the past 1-2 months due to life stressors and family deaths; reports 3-4 drinks daily. No caffeine or other substances. EGD 10/30 without evidence of ongoing bleeding; moderate gastritis, non-bleeding duodenal ulcer with no stigmata of bleeding. Was initially cleared by GI for discharge if Hgb stabilized. Hgb continued to decrease 10/30 evening and 10/31 AM was 6.6; consented for blood and received 1 unit pRBCs. Hgb recheck after transfusion was 8.0, decreased 11/1 to 7.3.    - Recheck Hgb @1600   - GI recs 11/1; H. Pylori positive, quadruple therapy started 10/31   - Continue IV protonix 40mg BID until discharge, then omeprazole 20mg BID    - Bismuth, tetracycline, and metronidazole and PPI for 14 days   - Regular diet as tolerated   - Hgb monitoring and transfuse per medicine   -Will need test for eradication 4 weeks  after completion of antibiotic course  - Follow up alcohol use; patient appears motivated to quit drinking    Leukocytosis, improved  WBC elevated to 11.6 on arrival, improved to 7.9.     Obesity  Hyperglycemia  Glucose of 128 on admission but with A1C of 5.2.     Healthcare Maintenance  No PCP, will discuss follow up closer to discharge. Willing to see PCP at the same clinic as his wife or [facility].        Diet: Regular Diet Adult    DVT Prophylaxis: Pneumatic Compression Devices  South Catheter: Not present  Fluids: PO   Lines: None     Cardiac Monitoring: ACTIVE order. Indication: Acute GIB - anemia - sinus tachycardia  Code Status: Full Code        Disposition Plan      Expected Discharge Date: 11/2/23              The patient's care was discussed with MD OPAL Abreu, MS4  Medical Student  Hospitalist Service  [facility]  Securely message with [employer] (more info)  Text page via [employer] Paging/Directory   ______________________________________________________________________    Interval History   Patient stable overnight. Frustrated with hemoglobin drop this morning after receiving transfusion yesterday. Worried about it dropping further if he were to go home. Otherwise feeling well, no abdominal pain. 2 black tarry stools since yesterday. Appetite is good, no nausea.     Physical Exam   Vital Signs: Temp: 98  F (36.7  C) Temp src: Oral BP: 103/64 Pulse: 94   Resp: 24 SpO2: 99 % O2 Device: None (Room air)    Weight: 220 lbs 0 oz    Constitutional: awake, alert, cooperative, no apparent distress, and appears stated age  Respiratory: No increased work of breathing, good air exchange, clear to auscultation bilaterally, no crackles or wheezing  Cardiovascular: Normal apical impulse, regular rate and rhythm, normal S1 and S2, no S3 or S4, and no murmur noted  GI: No scars, normal bowel sounds, soft, non-distended, non-tender, no masses palpated, no  "hepatosplenomegaly    Data   Lab Results   Component Value Date    WBC 7.9 10/30/2023     Lab Results   Component Value Date    RBC 2.90 10/30/2023     Lab Results   Component Value Date    HGB 6.6 10/31/2023     Lab Results   Component Value Date    HCT 25.5 10/30/2023     No components found for: \"MCT\"  Lab Results   Component Value Date    MCV 88 10/30/2023     Lab Results   Component Value Date    MCH 29.3 10/30/2023     Lab Results   Component Value Date    MCHC 33.3 10/30/2023     Lab Results   Component Value Date    RDW 13.2 10/30/2023     Lab Results   Component Value Date     10/30/2023        "

## 2023-11-01 NOTE — DISCHARGE SUMMARY
"[facility]  Discharge Summary - Medicine & Pediatrics       Date of Admission:  10/29/2023  Date of Discharge:  11/1/2023  Discharging Provider: Alexandre Garcia  Discharge Service: Hospitalist Service    Discharge Diagnoses   Upper GI Bleed  Anemia  Melena  Tachycardia    Clinically Significant Risk Factors     # Obesity: Estimated body mass index is 31.04 kg/m  as calculated from the following:    Height as of this encounter: 1.702 m (5' 7\").    Weight as of this encounter: 89.9 kg (198 lb 3.1 oz).       Follow-ups Needed After Discharge   - Recheck Hgb 11/3 in clinic  - Follow up H. Pylori treatment- quadruple therapy to be completed 11/14 followed by test of cure  - Follow up with alcohol use; consider medication management to assist with sobriety      Unresulted Labs Ordered in the Past 30 Days of this Admission       No orders found from 9/29/2023 to 10/30/2023.        These results will be followed up by Alexandre Meek    Discharge Disposition   Discharged to home  Condition at discharge: Stable    Hospital Course   Gretel Gross was admitted on 10/29/2023 for 1 day history of melena concerning for upper GI bleed.  The following problems were addressed during his hospitalization:  Melena, anemia, sinus tachycardia, and leukocytosis.     Patient presented after 1 day of dark black tarry stools and symptoms of shortness of breath, palpitations, dizziness and headaches. On arrival, found to be tachycardic to 115 but normotensive with a Hgb of 10.8 on admission. CTA abd/pelvic normal. Upper GI bleed was suspected and GI was consulted. EGD completed next day with findings of non-bleeding duodenal ulcer without stigmata of bleeding, moderate gastritis and gastric biopsy that later resulted positive for H. Pylori. Was initially cleared to discharge by GI if Hgb was stable following procedure. Hgb decreased to 6.6 after EGD, he was receive 1 unit pRBCs. He was started on " quadruple therapy for H. Pylori. On morning of discharge, Hgb initially dropped to 7.3 but increased later in the day to 8.3 and patient felt comfortable discharging home at this time.     Did note that he was drinking increased amounts of alcohol in the last 1-2 months due to increased life stressors and family deaths. Is very motivated for sobriety and understands the role that alcohol may have had in his GI bleed. No evidence of withdrawal during stay.     No real prior medical history or PCP, this was established at discharge.     Consultations This Hospital Stay   GASTROENTEROLOGY IP CONSULT    Code Status   Full Code       The patient was discussed with Dr. Radha DUARTE, MS4  [address] [address]  Phone: [phone]  Fax: [phone]  ______________________________________________________________________    Physical Exam   Vital Signs: Temp: 99.2  F (37.3  C) Temp src: Oral BP: 120/76 Pulse: 96   Resp: 16 SpO2: 100 % O2 Device: None (Room air)    Weight: 198 lbs 3.1 oz  Constitutional: awake, alert, cooperative, no apparent distress, and appears stated age  Respiratory: No increased work of breathing, good air exchange, clear to auscultation bilaterally, no crackles or wheezing  Cardiovascular: Normal apical impulse, regular rate and rhythm, normal S1 and S2, no S3 or S4, and no murmur noted  GI: No scars, normal bowel sounds, soft, non-distended, non-tender, no masses palpated, no hepatosplenomegally  Neurologic: Awake, alert, oriented to name, place and time.  Cranial nerves II-XII are grossly intact.        Primary Care Physician   Physician No Ref-Primary    Discharge Orders      Reason for your hospital stay    You were admitted for a GI bleed related to H. Pylori     Follow-up and recommended labs and tests     -Follow-up at [facility] this Friday for a hemoglobin check  -Follow-up with Dr. Meek next Thursday for  hospital follow-up     Activity    Your activity upon discharge: activity as tolerated     Diet    Follow this diet upon discharge: Orders Placed This Encounter      Regular Diet Adult       Significant Results and Procedures   Most Recent 3 CBC's:  Recent Labs   Lab Test 11/01/23  1601 11/01/23  0801 10/31/23  1615 10/30/23  1443 10/30/23  0703 10/30/23  0331 10/29/23  2250 10/29/23  2053   WBC  --   --   --   --  7.9 9.5  --  11.6*   HGB 8.2* 7.3* 8.0*   < > 8.5* 9.0*   < > 10.8*   MCV  --   --   --   --  88 87  --  88   PLT  --   --   --   --  166 177  --  223    < > = values in this interval not displayed.       Discharge Medications   Current Discharge Medication List        START taking these medications    Details   bismuth subsalicylate (PEPTO BISMOL) 262 MG chewable tablet Take 2 tablets (524 mg) by mouth 4 times daily (before meals and nightly) for 13 days  Qty: 104 tablet, Refills: 0    Associated Diagnoses: Duodenal ulcer due to Helicobacter pylori      metroNIDAZOLE (FLAGYL) 250 MG tablet Take 1 tablet (250 mg) by mouth 4 times daily for 13 days  Qty: 52 tablet, Refills: 0    Associated Diagnoses: Duodenal ulcer due to Helicobacter pylori      omeprazole (PRILOSEC) 20 MG DR capsule Take 1 capsule (20 mg) by mouth 2 times daily for 14 days  Qty: 28 capsule, Refills: 0    Associated Diagnoses: Duodenal ulcer due to Helicobacter pylori      tetracycline (ACHROMYCIN/SUMYCIN) 500 MG capsule Take 1 capsule (500 mg) by mouth 4 times daily for 13 days  Qty: 52 capsule, Refills: 0    Associated Diagnoses: Duodenal ulcer due to Helicobacter pylori           STOP taking these medications       ibuprofen (ADVIL/MOTRIN) 200 MG tablet Comments:   Reason for Stopping:             Allergies   No Known Allergies

## 2023-11-01 NOTE — PROGRESS NOTES
"[facility] Progress Note       SUBJECTIVE:  Patient continues to deny abdominal pain, eating fine. He feels gassy and has not had a bowel movement since procedure. Discouraged that his hemoglobin dropped again today.    Patient seen with wife on video chat.        OBJECTIVE:  /64 (BP Location: Left arm)   Pulse 94   Temp 98  F (36.7  C) (Oral)   Resp 24   Ht 1.702 m (5' 7\")   Wt 99.8 kg (220 lb)   SpO2 99%   BMI 34.46 kg/m    Temp (24hrs), Av.4  F (36.9  C), Min:98  F (36.7  C), Max:98.8  F (37.1  C)    Patient Vitals for the past 72 hrs:   Weight   10/29/23 2003 99.8 kg (220 lb)       Intake/Output Summary (Last 24 hours) at 10/31/2023 0840  Last data filed at 10/30/2023 1400  Gross per 24 hour   Intake 220 ml   Output --   Net 220 ml        PHYSICAL EXAM  GEN: NAD, male appears stated age lying in bed  HRT: no LE edema  RESP: unlabored  ABD: soft, nontender  SKIN: No rash or jaundice      Additional Data:  I have reviewed the patient's new clinical lab results:     Recent Labs   Lab Test 23  0801 10/31/23  1615 10/31/23  0704 10/30/23  1443 10/30/23  0703 10/30/23  0331 10/29/23  2250 10/29/23  2053   WBC  --   --   --   --  7.9 9.5  --  11.6*   HGB 7.3* 8.0* 6.6*   < > 8.5* 9.0*   < > 10.8*   MCV  --   --   --   --  88 87  --  88   PLT  --   --   --   --  166 177  --  223    < > = values in this interval not displayed.     Recent Labs   Lab Test 10/30/23  0703 10/29/23  2053   POTASSIUM 3.8 4.1   CHLORIDE 106 105   CO2 22 21*   BUN 23.6* 37.3*   ANIONGAP 11 13     Recent Labs   Lab Test 10/29/23  2053   ALBUMIN 3.9   BILITOTAL 0.3   ALT 25   AST 16         Imaging results:  CTA abdomen/pelvis 10/29/23:  IMPRESSION:  1.  No evidence for abdominal aortic aneurysm. No significant atherosclerotic disease involving the abdominal aorta or the branch vessels. No hemodynamically significant stenosis involving the branch arteries of the abdominal aorta and the iliac arteries in the " pelvis. No CTA evidence for active GI extravasation.  2.  Nonspecific nonobstructive bowel gas pattern. Appendix visualized and appears within normal limits. No evidence for colitis or diverticulitis.    Procedure results:  EGD 10/30/23 ([facility]):  Findings:       No blood present during examination.        The examined esophagus was normal.        Scattered moderate inflammation characterized by erythema was found in        the gastric antrum. Biopsies were taken with a cold forceps for        Helicobacter pylori testing.        One non-bleeding cratered duodenal ulcer with no stigmata of bleeding        was found in the duodenal bulb. The lesion was 7 mm in largest dimension.     Impression:    - Normal esophagus.                          - Gastritis. Biopsied.                          - Non-bleeding duodenal ulcer with no stigmata of                          bleeding.   Recommendation:        - Await pathology results.                          - Use Protonix (pantoprazole) 40 mg IV daily. Upon                          discharge, will need omeprazole 20 mg daily for 2                          months                          - okay to advance diet. Low risk for rebleeding based                          upon endoscopic classification. Can discharge once                          hemoglobin stabilizes                          - regular diet      Final Diagnosis   STOMACH, BIOPSY:  -ANTRAL MUCOSA WITH MODERATE ACTIVE CHRONIC GASTRITIS  -ANTRAL FUNDIC TRANSITIONAL ZONE MUCOSA WITH MILD CHRONIC GASTRITIS  -NO INTESTINAL METAPLASIA  -NUMEROUS HELICOBACTER PYLORI       IMPRESSION:  Duodenal ulcer  H Pylori infection  Upper GI bleed  31 y/o male without significant PMH admitted 10/29 for upper GI bleed after presenting with melena and associated dizziness, fatigue and heart palpitations. CTA negative for active GI bleed. EGD 10/30 with one non-bleeding cratered duodenal ulcer and scattered moderate inflammation in the gastric  antrum. Pathology positive for H. Pylori and he was started on quadruple therapy 10/31. Hemoglobin 6.6 yesterday, then 8.0 after 1 unit PRBCs, down to 7.3 this AM. He is discouraged but we discussed there is unlikely anything other than ulcer causing anemia, H. Pylori likely cause for ulcer and we will need to test for eradication, and that without significant overt bleeding there is no indication for repeat CTA or EGD. His next stool likely to be black due to residual blood and he understands this.     PLAN:  - Continue IV PPI until discharge, then omperazole 20 mg BID as part of H. Pylori treatment to complete 14 days, then once daily  - H. Pylori treatment with quadruple therapy started 10/31, bismuth, tetracycline, metronidazole, and PPI  - Regular diet as tolerated  - Hemoglobin monitoring and transfusion per medicine      (Dr. Ha)  Simran Chappell PA-C  [facility]  11/1/2023 9:38 AM  [phone] (office)    40 minutes of total time was spent providing patient care, including patient evaluation, reviewing documentation/test results, , and documentation.  ________________________________________________________________________

## 2023-11-01 NOTE — PROGRESS NOTES
Pt deemed medically ready for discharge per MD. Discharge instructions gone over with pt- questions answered. Pt left with all belongings. PIV removed, pt to pickup medications at [facility] tomorrow.    Roxy Hill RN

## 2023-11-02 ENCOUNTER — PATIENT OUTREACH (OUTPATIENT)
Dept: CARE COORDINATION | Facility: CLINIC | Age: 33
End: 2023-11-02
Payer: COMMERCIAL

## 2023-11-02 LAB
ATRIAL RATE - MUSE: 131 BPM
DIASTOLIC BLOOD PRESSURE - MUSE: 68 MMHG
INTERPRETATION ECG - MUSE: NORMAL
P AXIS - MUSE: 58 DEGREES
PR INTERVAL - MUSE: 146 MS
QRS DURATION - MUSE: 70 MS
QT - MUSE: 274 MS
QTC - MUSE: 404 MS
R AXIS - MUSE: 114 DEGREES
SYSTOLIC BLOOD PRESSURE - MUSE: 118 MMHG
T AXIS - MUSE: 7 DEGREES
VENTRICULAR RATE- MUSE: 131 BPM

## 2023-11-02 ASSESSMENT — ACTIVITIES OF DAILY LIVING (ADL): DEPENDENT_IADLS:: INDEPENDENT

## 2023-11-02 NOTE — PROGRESS NOTES
Clinic Care Coordination Contact  [facility]: Post-Discharge Note  SITUATION                                                      Admission:    Admission Date: 10/29/23   Reason for Admission: Upper GI Bleed  Anemia  Melena  Tachycardia  Discharge:   Discharge Date: 11/01/23  Discharge Diagnosis: Upper GI Bleed  Anemia  Melena  Tachycardia    BACKGROUND                                                      Per hospital discharge summary and inpatient provider notes:      ASSESSMENT           Discharge Assessment  How are you doing now that you are home?: Pt is doing better  How are your symptoms? (Red Flag symptoms escalate to triage hotline per guidelines): Improved  Do you feel your condition is stable enough to be safe at home until your provider visit?: Yes  Does the patient have their discharge instructions? : Yes  Does the patient have questions regarding their discharge instructions? : No  Were you started on any new medications or were there changes to any of your previous medications? : Yes  Does the patient have all of their medications?: Yes  Do you have questions regarding any of your medications? : No  Do you have all of your needed medical supplies or equipment (DME)?  (i.e. oxygen tank, CPAP, cane, etc.): No - What equipment or supplies are needed?  Discharge follow-up appointment scheduled within 14 calendar days? : Yes  Discharge Follow Up Appointment Date: 11/09/23  Discharge Follow Up Appointment Scheduled with?: Primary Care Provider                  PLAN                                                      Outpatient Plan:      Future Appointments   Date Time Provider Department Center   11/3/2023  1:45 PM [facility]   11/9/2023  1:40 PM Alexandra Velasquez MD [facility]         For any urgent concerns, please contact our 24 hour nurse triage line: [phone]       KIMI Patel

## 2023-11-03 ENCOUNTER — LAB (OUTPATIENT)
Dept: LAB | Facility: CLINIC | Age: 33
End: 2023-11-03
Payer: COMMERCIAL

## 2023-11-03 DIAGNOSIS — K92.1 GASTROINTESTINAL HEMORRHAGE WITH MELENA: ICD-10-CM

## 2023-11-03 LAB — HGB BLD-MCNC: 8.6 G/DL (ref 13.3–17.7)

## 2023-11-03 PROCEDURE — 85018 HEMOGLOBIN: CPT

## 2023-11-03 PROCEDURE — 36415 COLL VENOUS BLD VENIPUNCTURE: CPT

## 2023-11-09 ENCOUNTER — OFFICE VISIT (OUTPATIENT)
Dept: FAMILY MEDICINE | Facility: CLINIC | Age: 33
End: 2023-11-09
Payer: COMMERCIAL

## 2023-11-09 VITALS
DIASTOLIC BLOOD PRESSURE: 80 MMHG | RESPIRATION RATE: 18 BRPM | TEMPERATURE: 97 F | HEART RATE: 78 BPM | HEIGHT: 66 IN | WEIGHT: 201 LBS | OXYGEN SATURATION: 99 % | SYSTOLIC BLOOD PRESSURE: 112 MMHG | BODY MASS INDEX: 32.3 KG/M2

## 2023-11-09 DIAGNOSIS — Z78.9 ALCOHOL USE: ICD-10-CM

## 2023-11-09 DIAGNOSIS — K27.9 H PYLORI ULCER: ICD-10-CM

## 2023-11-09 DIAGNOSIS — B96.81 H PYLORI ULCER: ICD-10-CM

## 2023-11-09 DIAGNOSIS — K92.1 GASTROINTESTINAL HEMORRHAGE WITH MELENA: Primary | ICD-10-CM

## 2023-11-09 LAB — HGB BLD-MCNC: 9 G/DL (ref 13.3–17.7)

## 2023-11-09 PROCEDURE — 99495 TRANSJ CARE MGMT MOD F2F 14D: CPT | Mod: GC

## 2023-11-09 PROCEDURE — 36415 COLL VENOUS BLD VENIPUNCTURE: CPT

## 2023-11-09 PROCEDURE — 85018 HEMOGLOBIN: CPT

## 2023-11-09 NOTE — PROGRESS NOTES
Preceptor Attestation:   Patient seen, evaluated and discussed with the resident. I have verified the content of the note, which accurately reflects my assessment of the patient and the plan of care.    Supervising Physician:Nadege Em MD    [facility]

## 2023-11-09 NOTE — PROGRESS NOTES
Assessment & Plan     Gastrointestinal hemorrhage with melena  Patient hospitalized for upper GI bleed with anemia. His hemoglobin continues to improve. He still has fatigue, but denies any further signs or symptoms of bleeding.   - Hemoglobin; Future  - Hemoglobin  - omeprazole (PRILOSEC) 20 MG DR capsule; Take 1 capsule (20 mg) by mouth daily    H pylori ulcer  Found to have H Pylori and was started on quadruple therapy, continuing this now. States he has been good at taking it as prescribed. Per GI notes, also recommend to continue Omeprazole 20mg daily after completing his treatment. He was already contacted by [facility] and has his repeat test after he has completed treatment set up with them.     Alcohol use  Patient states he has not been drinking while on quadruple therapy. Discussed various resources to continue cessation or to cut down once he is finished with treatment, he is not interested today.     MED REC REQUIRED  Post Medication Reconciliation Status: discharge medications reconciled, continue medications without change  Nicotine/Tobacco Cessation:  He reports that he has been smoking. He does not have any smokeless tobacco history on file.  Nicotine/Tobacco Cessation Plan:   Information offered: Patient not interested at this time    No follow-ups on file.    Alexandra Velasquez MD  [facility]      Rubia Majano is a 32 year old, presenting for the following health issues:  Hospital F/U and Forms (FMLA)    HPI     Hospital follow up:  Still having some black stool, although states last black stool was a few days ago  Hgb six days ago was 8.6, will check today  Still notes some increased exhaustion   Arms sore from IV draws etc  Still on quadruple therapy, been taking as prescribed still has about a week left  Works for a wealth [employer]  Not interested in any resources to decrease alcohol intake, does not feel it is a problem          11/2/2023    10:10 AM   Post  Discharge Outreach   Admission Date 10/29/2023   Reason for Admission Upper GI Bleed  Anemia  Melena  Tachycardia   Discharge Date 11/1/2023   Discharge Diagnosis Upper GI Bleed  Anemia  Melena  Tachycardia   How are you doing now that you are home? Pt is doing better   How are your symptoms? (Red Flag symptoms escalate to triage hotline per guidelines) Improved   Do you feel your condition is stable enough to be safe at home until your provider visit? Yes   Does the patient have their discharge instructions?  Yes   Does the patient have questions regarding their discharge instructions?  No   Were you started on any new medications or were there changes to any of your previous medications?  Yes   Does the patient have all of their medications? Yes   Do you have questions regarding any of your medications?  No   Do you have all of your needed medical supplies or equipment (DME)?  (i.e. oxygen tank, CPAP, cane, etc.) No - What equipment or supplies are needed?   Discharge follow-up appointment scheduled within 14 calendar days?  Yes   Discharge Follow Up Appointment Date 11/9/2023   Discharge Follow Up Appointment Scheduled with? Primary Care Provider     Hospital Follow-up Visit:    Hospital/Nursing Home/ Rehab Facility: [facility]  Date of Admission: 10/29/23  Date of Discharge: 11/1/23  Reason(s) for Admission: Upper GI bleed, anemia, melena, tachycardia    Was your hospitalization related to COVID-19? No   Problems taking medications regularly:  None  Medication changes since discharge: None  Problems adhering to non-medication therapy:  None    Summary of hospitalization:  [facility] discharge summary reviewed  Diagnostic Tests/Treatments reviewed.  Follow up needed: will need test of cure four weeks after completed treatment  Other Healthcare Providers Involved in Patient s Care:          [facility]  Update since discharge: improved.       Plan of care communicated with  "patient     Review of Systems   Constitutional, HEENT, cardiovascular, pulmonary, gi and gu systems are negative, except as otherwise noted.      Objective    /80   Pulse 78   Temp 97  F (36.1  C) (Tympanic)   Resp 18   Ht 1.676 m (5' 6\")   Wt 91.2 kg (201 lb)   SpO2 99%   BMI 32.44 kg/m    Body mass index is 32.44 kg/m .  Physical Exam   GENERAL: healthy, alert and no distress  NECK: no adenopathy, no asymmetry, masses, or scars and thyroid normal to palpation  RESP: lungs clear to auscultation - no rales, rhonchi or wheezes  CV: regular rate and rhythm, normal S1 S2, no S3 or S4, no murmur, click or rub, no peripheral edema and peripheral pulses strong  ABDOMEN: soft, nontender, no hepatosplenomegaly, no masses and bowel sounds normal  MS: no gross musculoskeletal defects noted, no edema          "

## 2023-11-20 ENCOUNTER — TELEPHONE (OUTPATIENT)
Dept: FAMILY MEDICINE | Facility: CLINIC | Age: 33
End: 2023-11-20
Payer: COMMERCIAL

## 2023-11-20 NOTE — TELEPHONE ENCOUNTER
[facility] phone call message- medication clarification/question:    Full Medication Name: omeprazole (PRILOSEC) 20 MG DR capsule           Question: Patient called because he has received a text message regarding medication is ready for . But patient was not aware that he was suppose to be taking medication for 2 months? Patient stated that he will like a call back regarding this because he's unsure. Please call and advise, thank you.      Pharmacy confirmed as [facility] [id number] [place] [address]: Yes    OK to leave a message on voice mail? Yes    Primary language: English      needed? No    Call taken on November 20, 2023 at 1:54 PM by Stevenson Garcia

## 2023-11-22 NOTE — TELEPHONE ENCOUNTER
Patient is calling back regarding message below. Advise, Dr. Velasquez has not yet responded back. If can please call and let patient know before end of day. Thank you

## 2023-12-04 ENCOUNTER — OFFICE VISIT (OUTPATIENT)
Dept: FAMILY MEDICINE | Facility: CLINIC | Age: 33
End: 2023-12-04
Payer: COMMERCIAL

## 2023-12-04 VITALS
SYSTOLIC BLOOD PRESSURE: 114 MMHG | BODY MASS INDEX: 32.29 KG/M2 | DIASTOLIC BLOOD PRESSURE: 81 MMHG | OXYGEN SATURATION: 100 % | WEIGHT: 200.08 LBS | HEART RATE: 77 BPM

## 2023-12-04 DIAGNOSIS — K92.1 GASTROINTESTINAL HEMORRHAGE WITH MELENA: Primary | ICD-10-CM

## 2023-12-04 PROCEDURE — 99213 OFFICE O/P EST LOW 20 MIN: CPT | Mod: GC

## 2023-12-04 RX ORDER — SULFAMETHOXAZOLE/TRIMETHOPRIM 800-160 MG
2 TABLET ORAL 2 TIMES DAILY
Qty: 40 TABLET | Refills: 0 | Status: CANCELLED | OUTPATIENT
Start: 2023-12-04

## 2023-12-04 NOTE — PROGRESS NOTES
Assessment & Plan     Gastrointestinal hemorrhage with melena  Recently hospitalized for anemia related to UGIB, found to have h pylori. Completed treatment two weeks ago. Per [facility]'s instructions, he is to check his test for cure after being off of PPI for two weeks and pepto bismol/antibiotics for four weeks. He will need to be on omeprazole for 8 weeks post bleed. Discussed the risk factors in developing ulcers beyong H pylori including alcohol, smoking, NDSAIDs, certain foods. Filled out form for him to return full time without restrictions next week.    Follow-up early next year for preventative visit.    Alexandra Velasquez MD  [facility]    Rubia Majano is a 32 year old, presenting for the following health issues:  RECHECK (H. Pylori, pt states feeling 80/90%. Directions say 4 weeks after Pepto Bismol but was told 2 weeks. Pt wanting clarification) and Forms (Return to work form for work)    HPI     Follow-up H Pylori  - recently hospitalized for UGIB w/ anemia, found to have H Pylori  - treated with quadruple therapy, ended treatment two weeks ago  - [facility] had reached out to patient with plans for him to test for cure, told him he must be off all medications (omeprazole) for two weeks prior to testing  - per GI notes in hospital, must be on omeprazole for two months post-bleed   - Hgb rising appropriately at last office visit  - today feeling good, less fatigued  - done with all treament two weeks ago  - no melena/hematochezia/nausea/vomiting      Review of Systems   Constitutional, HEENT, cardiovascular, pulmonary, gi and gu systems are negative, except as otherwise noted.      Objective    /81 (BP Location: Right arm, Patient Position: Sitting, Cuff Size: Adult Regular)   Pulse 77   Wt 90.8 kg (200 lb 1.3 oz)   SpO2 100%   BMI 32.29 kg/m    Body mass index is 32.29 kg/m .  Physical Exam   Gen: Pleasant. No distress.    HEENT: MMM.   Neck: No overt asymmetry.   CV:  Appears well-perfused. RRR. No murmur.   Resp: Breathing comfortably on room air. Lungs clear to auscultation bilaterally without wheeze or crackle.   Abd: Non-distended, non-tender.   Ext: No edema or overt asymmetry/deformity.   Skin: No overt rash on easily visualized skin.   Neuro: Non-focal.   Psych: Calm.

## 2023-12-12 DIAGNOSIS — K92.1 GASTROINTESTINAL HEMORRHAGE WITH MELENA: ICD-10-CM

## 2023-12-12 DIAGNOSIS — B96.81 H PYLORI ULCER: ICD-10-CM

## 2023-12-12 DIAGNOSIS — K27.9 H PYLORI ULCER: ICD-10-CM

## 2023-12-12 NOTE — TELEPHONE ENCOUNTER
Message to physician:     Date of last visit: 12/4/2023    Date of next visit if scheduled:     Potassium   Date Value Ref Range Status   10/30/2023 3.8 3.4 - 5.3 mmol/L Final     Creatinine   Date Value Ref Range Status   10/30/2023 1.09 0.67 - 1.17 mg/dL Final     GFR Estimate   Date Value Ref Range Status   10/30/2023 >90 >60 mL/min/1.73m2 Final       BP Readings from Last 3 Encounters:   12/04/23 114/81   11/09/23 112/80   11/01/23 120/76       Hemoglobin A1C   Date Value Ref Range Status   10/29/2023 5.2 <5.7 % Final     Comment:     Normal <5.7%   Prediabetes 5.7-6.4%    Diabetes 6.5% or higher     Note: Adopted from ADA consensus guidelines.       Please complete refill and CLOSE ENCOUNTER.  Closing the encounter signifies the refill is complete.

## 2023-12-30 ENCOUNTER — HEALTH MAINTENANCE LETTER (OUTPATIENT)
Age: 33
End: 2023-12-30

## 2024-01-08 DIAGNOSIS — K92.1 GASTROINTESTINAL HEMORRHAGE WITH MELENA: ICD-10-CM

## 2024-01-08 DIAGNOSIS — K27.9 H PYLORI ULCER: ICD-10-CM

## 2024-01-08 DIAGNOSIS — B96.81 H PYLORI ULCER: ICD-10-CM

## 2024-01-09 ENCOUNTER — TELEPHONE (OUTPATIENT)
Dept: FAMILY MEDICINE | Facility: CLINIC | Age: 34
End: 2024-01-09
Payer: COMMERCIAL

## 2024-01-09 NOTE — TELEPHONE ENCOUNTER
[facility] phone call message- medication clarification/question:    Full Medication Name: omeprazole (PRILOSEC) 20 MG DR capsule       Question: Patient stated he picked up 60 tabs last month and thought he was only suppose to finish the 60 tablets and be done taking the medication, but he got a notification from [facility] that a refill has been sent and he is unsure why more medication is being refilled? Please advise patient further thank you.    Pharmacy confirmed as [facility] [id number] [place] [address]: Yes    OK to leave a message on voice mail? Yes    Primary language: English      needed? No    Call taken on January 9, 2024 at 10:21 AM by Raf Pink

## 2025-01-19 ENCOUNTER — HEALTH MAINTENANCE LETTER (OUTPATIENT)
Age: 35
End: 2025-01-19

## (undated) DEVICE — PLATE GROUNDING ADULT W/CORD 9165L

## (undated) DEVICE — SWABCAP DISINFECTANT GREEN CFF10-250

## (undated) DEVICE — SYR 03ML LL W/O NDL 309657

## (undated) DEVICE — BITE BLOCK SCOPE DISP 20 X 27 000429

## (undated) DEVICE — SUCTION CANISTER 1000ML 65651-510

## (undated) DEVICE — CANNULA NASAL COMFORT SOFT 25FT 0537

## (undated) DEVICE — CATH SUCTION 14FR W/O CTRL DYND41962

## (undated) DEVICE — TUBING ENDOGATOR + H2O PORT CO

## (undated) DEVICE — KIT IV START DYNDV1431

## (undated) DEVICE — KIT SCOPE CLEANING ENDOSCOPY BX00719705

## (undated) DEVICE — CONNECTOR ONE-LINK INJECTION SITE LF 7N8399

## (undated) DEVICE — FORCEP BIOPSY 2.3MM DISP COATED 000388

## (undated) DEVICE — SOL WATER IRRIG 500ML BOTTLE 2F7113

## (undated) DEVICE — KIT CONNECTOR FOR OLYMPUS ENDOSCOPES DEFENDO 100310

## (undated) DEVICE — TUBING SUCTION MEDI-VAC 1/4"X20' N620A - HE